# Patient Record
Sex: FEMALE | Race: WHITE | NOT HISPANIC OR LATINO | URBAN - METROPOLITAN AREA
[De-identification: names, ages, dates, MRNs, and addresses within clinical notes are randomized per-mention and may not be internally consistent; named-entity substitution may affect disease eponyms.]

---

## 2018-11-07 ENCOUNTER — INPATIENT (INPATIENT)
Facility: HOSPITAL | Age: 73
LOS: 8 days | Discharge: NURSING, REHAB OR LONG-TERM CARE | End: 2018-11-16
Attending: INTERNAL MEDICINE | Admitting: INTERNAL MEDICINE

## 2018-11-07 VITALS
TEMPERATURE: 96 F | OXYGEN SATURATION: 96 % | RESPIRATION RATE: 17 BRPM | HEART RATE: 89 BPM | SYSTOLIC BLOOD PRESSURE: 149 MMHG | DIASTOLIC BLOOD PRESSURE: 74 MMHG

## 2018-11-07 LAB
ALBUMIN SERPL ELPH-MCNC: 3.4 G/DL — LOW (ref 3.5–5.2)
ALP SERPL-CCNC: 68 U/L — SIGNIFICANT CHANGE UP (ref 30–115)
ALT FLD-CCNC: 15 U/L — SIGNIFICANT CHANGE UP (ref 0–41)
ANION GAP SERPL CALC-SCNC: 17 MMOL/L — HIGH (ref 7–14)
APTT BLD: 21.4 SEC — CRITICAL LOW (ref 27–39.2)
AST SERPL-CCNC: 36 U/L — SIGNIFICANT CHANGE UP (ref 0–41)
BASOPHILS # BLD AUTO: 0 K/UL — SIGNIFICANT CHANGE UP (ref 0–0.2)
BASOPHILS NFR BLD AUTO: 0 % — SIGNIFICANT CHANGE UP (ref 0–1)
BILIRUB SERPL-MCNC: 0.3 MG/DL — SIGNIFICANT CHANGE UP (ref 0.2–1.2)
BUN SERPL-MCNC: 72 MG/DL — CRITICAL HIGH (ref 10–20)
CALCIUM SERPL-MCNC: 8.4 MG/DL — LOW (ref 8.5–10.1)
CHLORIDE SERPL-SCNC: 93 MMOL/L — LOW (ref 98–110)
CK SERPL-CCNC: 467 U/L — HIGH (ref 0–225)
CO2 SERPL-SCNC: 20 MMOL/L — SIGNIFICANT CHANGE UP (ref 17–32)
CREAT SERPL-MCNC: 3.4 MG/DL — HIGH (ref 0.7–1.5)
EOSINOPHIL # BLD AUTO: 0.19 K/UL — SIGNIFICANT CHANGE UP (ref 0–0.7)
EOSINOPHIL NFR BLD AUTO: 1.7 % — SIGNIFICANT CHANGE UP (ref 0–8)
GAS PNL BLDV: SIGNIFICANT CHANGE UP
GLUCOSE SERPL-MCNC: 121 MG/DL — HIGH (ref 70–99)
HCT VFR BLD CALC: 30 % — LOW (ref 37–47)
HGB BLD-MCNC: 9.8 G/DL — LOW (ref 12–16)
INR BLD: 0.94 RATIO — SIGNIFICANT CHANGE UP (ref 0.65–1.3)
LYMPHOCYTES # BLD AUTO: 1.08 K/UL — LOW (ref 1.2–3.4)
LYMPHOCYTES # BLD AUTO: 9.6 % — LOW (ref 20.5–51.1)
MANUAL SMEAR VERIFICATION: SIGNIFICANT CHANGE UP
MCHC RBC-ENTMCNC: 30.5 PG — SIGNIFICANT CHANGE UP (ref 27–31)
MCHC RBC-ENTMCNC: 32.7 G/DL — SIGNIFICANT CHANGE UP (ref 32–37)
MCV RBC AUTO: 93.5 FL — SIGNIFICANT CHANGE UP (ref 81–99)
MONOCYTES # BLD AUTO: 0.29 K/UL — SIGNIFICANT CHANGE UP (ref 0.1–0.6)
MONOCYTES NFR BLD AUTO: 2.6 % — SIGNIFICANT CHANGE UP (ref 1.7–9.3)
NEUTROPHILS # BLD AUTO: 9.71 K/UL — HIGH (ref 1.4–6.5)
NEUTROPHILS NFR BLD AUTO: 86.1 % — HIGH (ref 42.2–75.2)
NRBC # BLD: 0 /100 WBCS — SIGNIFICANT CHANGE UP (ref 0–0)
PLAT MORPH BLD: NORMAL — SIGNIFICANT CHANGE UP
PLATELET # BLD AUTO: 72 K/UL — LOW (ref 130–400)
POTASSIUM SERPL-MCNC: 5.9 MMOL/L — HIGH (ref 3.5–5)
POTASSIUM SERPL-SCNC: 5.9 MMOL/L — HIGH (ref 3.5–5)
PROT SERPL-MCNC: 7.2 G/DL — SIGNIFICANT CHANGE UP (ref 6–8)
PROTHROM AB SERPL-ACNC: 10.8 SEC — SIGNIFICANT CHANGE UP (ref 9.95–12.87)
RBC # BLD: 3.21 M/UL — LOW (ref 4.2–5.4)
RBC # FLD: 13.9 % — SIGNIFICANT CHANGE UP (ref 11.5–14.5)
RBC BLD AUTO: NORMAL — SIGNIFICANT CHANGE UP
SODIUM SERPL-SCNC: 130 MMOL/L — LOW (ref 135–146)
WBC # BLD: 11.28 K/UL — HIGH (ref 4.8–10.8)
WBC # FLD AUTO: 11.28 K/UL — HIGH (ref 4.8–10.8)

## 2018-11-07 RX ORDER — SODIUM CHLORIDE 9 MG/ML
1000 INJECTION INTRAMUSCULAR; INTRAVENOUS; SUBCUTANEOUS ONCE
Qty: 0 | Refills: 0 | Status: COMPLETED | OUTPATIENT
Start: 2018-11-07 | End: 2018-11-07

## 2018-11-07 RX ADMIN — SODIUM CHLORIDE 2000 MILLILITER(S): 9 INJECTION INTRAMUSCULAR; INTRAVENOUS; SUBCUTANEOUS at 19:30

## 2018-11-07 NOTE — ED ADULT TRIAGE NOTE - CHIEF COMPLAINT QUOTE
"I fell and hurt my left ankle last Monday." Denies LOC. "I fell and hurt my left ankle last Monday." Denies LOC. Pt given Fentanyl 100 mg IVP by Paramedics pta. "I fell and hurt my left ankle last Monday." Denies LOC. Pt given Fentanyl 100 mg IVP by Paramedics pta.  Note: Bariatric patient.

## 2018-11-07 NOTE — ED PROVIDER NOTE - CARE PLAN
Principal Discharge DX:	Ambulatory dysfunction  Secondary Diagnosis:	Hyponatremia  Secondary Diagnosis:	Acute kidney failure

## 2018-11-07 NOTE — ED PROVIDER NOTE - OBJECTIVE STATEMENT
74 y/o F HTN, HLD, DM, obesity, CKD p/f fall at home c/o L ankle pain. Per  pt slipped while getting out of bed and transitioning to her walker on Monday morning. Then she slipped again while he was at work and was on the floor all day long until he found her and called [employer] to help get her back in bed, pt is no unable to get out of bed and ambulate.   also complains that pt is very forgetful for the past few weeks.

## 2018-11-07 NOTE — ED PROVIDER NOTE - ATTENDING CONTRIBUTION TO CARE
74 y/o F HTN, HLD, DM, obesity, CKD p/f fall at home c/o L ankle pain, patient at baseline needs help with every aspect of daily living. patient has a history ofsevere obesity, almost completely bed bound, patient attempted to ambulate with a walker and hurt her left ankle. No cp/sob, no n/v/d, no loc, no fever    CONSTITUTIONAL: Well-developed; well-nourished; in no acute distress. Sitting up and providing appropriate history and physical examination  SKIN: skin exam is warm and dry, no acute rash.  HEAD: Normocephalic; atraumatic.  EYES: PERRL, 3 mm bilateral, no nystagmus, EOM intact; conjunctiva and sclera clear.  ENT: No nasal discharge; airway clear.  NECK: Supple; non tender. + full passive ROM in all directions. No JVD  CARD: S1, S2 normal; no murmurs, gallops, or rubs. Regular rate and rhythm. + Symmetric Strong Pulses  RESP: No wheezes, rales or rhonchi. Good air movement bilaterally  ABD: soft; non-distended; non-tender. No Rebound, No Guarding, No signs of peritonitis, No CVA tenderness. No pulsatile abdominal mass. + Strong and Symmetric Pulses  EXT: + left lateral malleolar tenderness, + Unable to ambulate, limited ROM of the left ankle. No clubbing, cyanosis. Dp and Pt Pulses intact. Cap refill less than 3 seconds  NEURO: CN 2-12 intact,  no sensory or motor deficits, Alert, oriented, grossly unremarkable. No Focal deficits. GCS 15. NIH 0      Patient unable to ambulate, will admit

## 2018-11-07 NOTE — ED PROVIDER NOTE - NS ED ROS FT
Constitutional: NAD  Eyes: No visual changes, eye pain or discharge.  ENMT: No hearing changes, pain, discharge or infections. No neck pain or stiffness.  Cardiac: No chest pain, SOB or edema. No chest pain with exertion.  Respiratory: No cough or respiratory distress.   GI: No nausea, vomiting, diarrhea or abdominal pain.  : No dysuria, frequency or burning.  MS: No myalgia, muscle weakness, +ankle pain. No back pain.  Neuro: No headache or weakness. No LOC.  Skin: No skin rash.  Heme: No bruising

## 2018-11-07 NOTE — ED ADULT NURSE NOTE - OBJECTIVE STATEMENT
pt here c/o r ankle pain s/p fall at home . denies hitting head or LOC. Left ankle pain. pt morbidly obese. placed on bariatric bed on arrival and changed.

## 2018-11-07 NOTE — ED PROVIDER NOTE - PHYSICAL EXAMINATION
General: AOx4, morbidly obese. Non toxic appearing, NAD, speaking in full sentences.   Skin: Warm and dry, no acute rash.   Head:  Normocephalic, atraumatic.   EENT: PERRLA/EOMI, conjunctiva and sclera clear. MM moist, no nasal discharge.    Neck: Supple nt, no meningeal signs.   Heart RRR s1s2 nl, no rub/murmur.   Lungs- No retractions, BS equal, CTAB.   Abdomen: Soft ntnd no r/g.   Extremities- moves all, +equal distal pulses, brisk cap refill. No LE edema, calves nttp b/l. L ankle mildly ttp.   Neuro: Sensation wnl, CN 2-12 grossly intact. +5/5 muscle strength throughout.  Psych: Cooperative, appropriate

## 2018-11-07 NOTE — ED ADULT NURSE NOTE - CHIEF COMPLAINT QUOTE
"I fell and hurt my left ankle last Monday." Denies LOC. Pt given Fentanyl 100 mg IVP by Paramedics pta.  Note: Bariatric patient.

## 2018-11-07 NOTE — ED ADULT NURSE NOTE - NSIMPLEMENTINTERV_GEN_ALL_ED
Implemented All Fall with Harm Risk Interventions:  [place] to call system. Call bell, personal items and telephone within reach. Instruct patient to call for assistance. Room bathroom lighting operational. Non-slip footwear when patient is off stretcher. Physically safe environment: no spills, clutter or unnecessary equipment. Stretcher in lowest position, wheels locked, appropriate side rails in place. Provide visual cue, wrist band, yellow gown, etc. Monitor gait and stability. Monitor for mental status changes and reorient to person, place, and time. Review medications for side effects contributing to fall risk. Reinforce activity limits and safety measures with patient and family. Provide visual clues: red socks.

## 2018-11-08 LAB
ANION GAP SERPL CALC-SCNC: 18 MMOL/L — HIGH (ref 7–14)
BASOPHILS # BLD AUTO: 0.03 K/UL — SIGNIFICANT CHANGE UP (ref 0–0.2)
BASOPHILS NFR BLD AUTO: 0.3 % — SIGNIFICANT CHANGE UP (ref 0–1)
BUN SERPL-MCNC: 67 MG/DL — CRITICAL HIGH (ref 10–20)
CALCIUM SERPL-MCNC: 8.1 MG/DL — LOW (ref 8.5–10.1)
CHLORIDE SERPL-SCNC: 98 MMOL/L — SIGNIFICANT CHANGE UP (ref 98–110)
CO2 SERPL-SCNC: 19 MMOL/L — SIGNIFICANT CHANGE UP (ref 17–32)
CREAT SERPL-MCNC: 3.2 MG/DL — HIGH (ref 0.7–1.5)
EOSINOPHIL # BLD AUTO: 0.31 K/UL — SIGNIFICANT CHANGE UP (ref 0–0.7)
EOSINOPHIL NFR BLD AUTO: 3.1 % — SIGNIFICANT CHANGE UP (ref 0–8)
ESTIMATED AVERAGE GLUCOSE: 140 MG/DL — HIGH (ref 68–114)
GLUCOSE BLDC GLUCOMTR-MCNC: 164 MG/DL — HIGH (ref 70–99)
GLUCOSE SERPL-MCNC: 125 MG/DL — HIGH (ref 70–99)
HBA1C BLD-MCNC: 6.5 % — HIGH (ref 4–5.6)
HCT VFR BLD CALC: 30.8 % — LOW (ref 37–47)
HGB BLD-MCNC: 9.5 G/DL — LOW (ref 12–16)
IMM GRANULOCYTES NFR BLD AUTO: 0.6 % — HIGH (ref 0.1–0.3)
LYMPHOCYTES # BLD AUTO: 0.85 K/UL — LOW (ref 1.2–3.4)
LYMPHOCYTES # BLD AUTO: 8.6 % — LOW (ref 20.5–51.1)
MAGNESIUM SERPL-MCNC: 3.2 MG/DL — CRITICAL HIGH (ref 1.8–2.4)
MCHC RBC-ENTMCNC: 29.7 PG — SIGNIFICANT CHANGE UP (ref 27–31)
MCHC RBC-ENTMCNC: 30.8 G/DL — LOW (ref 32–37)
MCV RBC AUTO: 96.3 FL — SIGNIFICANT CHANGE UP (ref 81–99)
MONOCYTES # BLD AUTO: 0.67 K/UL — HIGH (ref 0.1–0.6)
MONOCYTES NFR BLD AUTO: 6.8 % — SIGNIFICANT CHANGE UP (ref 1.7–9.3)
NEUTROPHILS # BLD AUTO: 7.99 K/UL — HIGH (ref 1.4–6.5)
NEUTROPHILS NFR BLD AUTO: 80.6 % — HIGH (ref 42.2–75.2)
NRBC # BLD: 0 /100 WBCS — SIGNIFICANT CHANGE UP (ref 0–0)
PHOSPHATE SERPL-MCNC: 5.3 MG/DL — HIGH (ref 2.1–4.9)
PLATELET # BLD AUTO: 85 K/UL — LOW (ref 130–400)
POTASSIUM SERPL-MCNC: 6 MMOL/L — CRITICAL HIGH (ref 3.5–5)
POTASSIUM SERPL-SCNC: 6 MMOL/L — CRITICAL HIGH (ref 3.5–5)
RBC # BLD: 3.2 M/UL — LOW (ref 4.2–5.4)
RBC # FLD: 13.9 % — SIGNIFICANT CHANGE UP (ref 11.5–14.5)
SODIUM SERPL-SCNC: 135 MMOL/L — SIGNIFICANT CHANGE UP (ref 135–146)
WBC # BLD: 9.91 K/UL — SIGNIFICANT CHANGE UP (ref 4.8–10.8)
WBC # FLD AUTO: 9.91 K/UL — SIGNIFICANT CHANGE UP (ref 4.8–10.8)

## 2018-11-08 RX ORDER — TRAMADOL HYDROCHLORIDE 50 MG/1
50 TABLET ORAL EVERY 4 HOURS
Qty: 0 | Refills: 0 | Status: DISCONTINUED | OUTPATIENT
Start: 2018-11-08 | End: 2018-11-14

## 2018-11-08 RX ORDER — GLUCAGON INJECTION, SOLUTION 0.5 MG/.1ML
1 INJECTION, SOLUTION SUBCUTANEOUS ONCE
Qty: 0 | Refills: 0 | Status: DISCONTINUED | OUTPATIENT
Start: 2018-11-08 | End: 2018-11-10

## 2018-11-08 RX ORDER — DEXTROSE 50 % IN WATER 50 %
25 SYRINGE (ML) INTRAVENOUS ONCE
Qty: 0 | Refills: 0 | Status: DISCONTINUED | OUTPATIENT
Start: 2018-11-08 | End: 2018-11-10

## 2018-11-08 RX ORDER — DEXTROSE 50 % IN WATER 50 %
12.5 SYRINGE (ML) INTRAVENOUS ONCE
Qty: 0 | Refills: 0 | Status: DISCONTINUED | OUTPATIENT
Start: 2018-11-08 | End: 2018-11-10

## 2018-11-08 RX ORDER — SIMVASTATIN 20 MG/1
20 TABLET, FILM COATED ORAL AT BEDTIME
Qty: 0 | Refills: 0 | Status: DISCONTINUED | OUTPATIENT
Start: 2018-11-08 | End: 2018-11-16

## 2018-11-08 RX ORDER — DOCUSATE SODIUM 100 MG
100 CAPSULE ORAL DAILY
Qty: 0 | Refills: 0 | Status: DISCONTINUED | OUTPATIENT
Start: 2018-11-08 | End: 2018-11-16

## 2018-11-08 RX ORDER — INSULIN LISPRO 100/ML
5 VIAL (ML) SUBCUTANEOUS
Qty: 0 | Refills: 0 | Status: DISCONTINUED | OUTPATIENT
Start: 2018-11-08 | End: 2018-11-08

## 2018-11-08 RX ORDER — PANTOPRAZOLE SODIUM 20 MG/1
40 TABLET, DELAYED RELEASE ORAL
Qty: 0 | Refills: 0 | Status: DISCONTINUED | OUTPATIENT
Start: 2018-11-08 | End: 2018-11-16

## 2018-11-08 RX ORDER — LOSARTAN POTASSIUM 100 MG/1
25 TABLET, FILM COATED ORAL DAILY
Qty: 0 | Refills: 0 | Status: DISCONTINUED | OUTPATIENT
Start: 2018-11-08 | End: 2018-11-08

## 2018-11-08 RX ORDER — DEXTROSE 50 % IN WATER 50 %
15 SYRINGE (ML) INTRAVENOUS ONCE
Qty: 0 | Refills: 0 | Status: DISCONTINUED | OUTPATIENT
Start: 2018-11-08 | End: 2018-11-10

## 2018-11-08 RX ORDER — HEPARIN SODIUM 5000 [USP'U]/ML
5000 INJECTION INTRAVENOUS; SUBCUTANEOUS EVERY 8 HOURS
Qty: 0 | Refills: 0 | Status: DISCONTINUED | OUTPATIENT
Start: 2018-11-08 | End: 2018-11-16

## 2018-11-08 RX ORDER — METOPROLOL TARTRATE 50 MG
50 TABLET ORAL DAILY
Qty: 0 | Refills: 0 | Status: DISCONTINUED | OUTPATIENT
Start: 2018-11-08 | End: 2018-11-16

## 2018-11-08 RX ORDER — FOLIC ACID 0.8 MG
1 TABLET ORAL DAILY
Qty: 0 | Refills: 0 | Status: DISCONTINUED | OUTPATIENT
Start: 2018-11-08 | End: 2018-11-16

## 2018-11-08 RX ORDER — INSULIN GLARGINE 100 [IU]/ML
15 INJECTION, SOLUTION SUBCUTANEOUS AT BEDTIME
Qty: 0 | Refills: 0 | Status: DISCONTINUED | OUTPATIENT
Start: 2018-11-08 | End: 2018-11-08

## 2018-11-08 RX ORDER — SENNA PLUS 8.6 MG/1
1 TABLET ORAL DAILY
Qty: 0 | Refills: 0 | Status: DISCONTINUED | OUTPATIENT
Start: 2018-11-08 | End: 2018-11-16

## 2018-11-08 RX ORDER — SODIUM CHLORIDE 9 MG/ML
1000 INJECTION INTRAMUSCULAR; INTRAVENOUS; SUBCUTANEOUS
Qty: 0 | Refills: 0 | Status: DISCONTINUED | OUTPATIENT
Start: 2018-11-08 | End: 2018-11-11

## 2018-11-08 RX ORDER — SODIUM CHLORIDE 9 MG/ML
1000 INJECTION, SOLUTION INTRAVENOUS
Qty: 0 | Refills: 0 | Status: DISCONTINUED | OUTPATIENT
Start: 2018-11-08 | End: 2018-11-10

## 2018-11-08 RX ORDER — INSULIN LISPRO 100/ML
VIAL (ML) SUBCUTANEOUS
Qty: 0 | Refills: 0 | Status: DISCONTINUED | OUTPATIENT
Start: 2018-11-08 | End: 2018-11-08

## 2018-11-08 RX ADMIN — HEPARIN SODIUM 5000 UNIT(S): 5000 INJECTION INTRAVENOUS; SUBCUTANEOUS at 21:40

## 2018-11-08 RX ADMIN — SODIUM CHLORIDE 75 MILLILITER(S): 9 INJECTION INTRAMUSCULAR; INTRAVENOUS; SUBCUTANEOUS at 08:56

## 2018-11-08 RX ADMIN — SODIUM CHLORIDE 75 MILLILITER(S): 9 INJECTION INTRAMUSCULAR; INTRAVENOUS; SUBCUTANEOUS at 03:14

## 2018-11-08 RX ADMIN — PANTOPRAZOLE SODIUM 40 MILLIGRAM(S): 20 TABLET, DELAYED RELEASE ORAL at 08:56

## 2018-11-08 RX ADMIN — Medication 1 MILLIGRAM(S): at 11:45

## 2018-11-08 RX ADMIN — Medication 50 MILLIGRAM(S): at 05:28

## 2018-11-08 RX ADMIN — HEPARIN SODIUM 5000 UNIT(S): 5000 INJECTION INTRAVENOUS; SUBCUTANEOUS at 05:28

## 2018-11-08 RX ADMIN — HEPARIN SODIUM 5000 UNIT(S): 5000 INJECTION INTRAVENOUS; SUBCUTANEOUS at 14:19

## 2018-11-08 RX ADMIN — LOSARTAN POTASSIUM 25 MILLIGRAM(S): 100 TABLET, FILM COATED ORAL at 05:28

## 2018-11-08 RX ADMIN — SIMVASTATIN 20 MILLIGRAM(S): 20 TABLET, FILM COATED ORAL at 21:40

## 2018-11-08 NOTE — H&P ADULT - HISTORY OF PRESENT ILLNESS
74 y/o morbidly obese female patient with PMHx of HTN, DM, DLD and CKD p/w mechanical fall PTP. history was obtained from the ED chart as the patient is confused and unreliable and the  is not answering. Patient fell and twisted her ankle while getting out of bed to use her walker to the bathroom days ago.  Then she slipped again while he was at work and was on the floor all day long until her  found her and called [facility] to help get her back in bed. pt is no unable to get out of bed and ambulate. patient walks with a walker at baseline.   ROS wasn't obtained as patient is confused and unreliable.  in the ed Vital Signs Last 24 Hrs  T(C): 36.8 (08 Nov 2018 00:01), Max: 36.8 (08 Nov 2018 00:01)  T(F): 98.3 (08 Nov 2018 00:01), Max: 98.3 (08 Nov 2018 00:01)  HR: 87 (08 Nov 2018 00:01) (87 - 88)  BP: 137/64 (08 Nov 2018 00:01) (137/64 - 149/74)  BP(mean): --  RR: 18 (08 Nov 2018 00:01) (17 - 18)  SpO2: 96% (08 Nov 2018 00:01) (95% - 96%)    No ankle fracture per ED but admitted for PT and rehab

## 2018-11-08 NOTE — H&P ADULT - ASSESSMENT
74 y/o morbidly obese female patient with PMHx of HTN, DM, DLD and CKD p/w mechanical fall.      #Mechanical fall in the setting of morbid obesity and inability to ambulate   No evidence of fracture per ED  unsure of patient baseline status    admit to medicine  pain cotrol with tramadol  PT and rehab  confirm with the family in the am, patient baseline status     #LALO vs CKD  unknown baseline  urine electrolytes   monitor BMP  IV fluids    #Hyponatremia   proabably 2/2 to dehdyration  monitor BMP  IV fluids    #DM  insulin protocol  monitor FS    #HTN and DLD  c/w losartan and simvstatin    #Others  Diet, Consistent carb diet  DVT and GI PPX  from home  Full code 72 y/o morbidly obese female patient with PMHx of HTN, DM, DLD and CKD p/w mechanical fall.      #Mechanical fall in the setting of morbid obesity and inability to ambulate   No evidence of fracture per ED  unsure of patient baseline status    admit to medicine  pain cotrol with tramadol  PT and rehab  confirm with the family in the am, patient baseline status     #LALO vs CKD  unknown baseline  urine electrolytes   monitor BMP  IV fluids    #Hyponatremia   proabably 2/2 to dehdyration  monitor BMP  IV fluids    #DM  insulin protocol if FS more than 180  monitor FS    #HTN and DLD  c/w losartan and simvstatin    #Others  Diet, Consistent carb diet  DVT and GI PPX  from home  Full code

## 2018-11-08 NOTE — H&P ADULT - ATTENDING COMMENTS
74 y/o morbidly obese female patient with PMHx of HTN, DM, DLD and CKD p/w mechanical fall PTP. history was obtained from the ED chart as the patient is confused and unreliable and the  is not answering. Patient fell and twisted her ankle while getting out of bed to use her walker to the bathroom days ago.  Then she slipped again while he was at work and was on the floor all day long until her  found her and called [facility] to help get her back in bed. pt is no unable to get out of bed and ambulate. patient walks with a walker at baseline.   ROS wasn't obtained as patient is confused and unreliable.    REVIEW OF SYSTEMS: see cc/HPI  CONSTITUTIONAL: (+) Morbid obesity, No weakness, fevers or chills (+) pain -L ankle 2/2 fall and injury  EYES/ENT: No visual changes;  No vertigo or throat pain   NECK: No pain or stiffness  RESPIRATORY: No cough, wheezing, hemoptysis; No shortness of breath  CARDIOVASCULAR: No chest pain or palpitations  GASTROINTESTINAL: No abdominal or epigastric pain. No nausea, vomiting, or hematemesis; No diarrhea or constipation. No melena or hematochezia.  GENITOURINARY: No dysuria, frequency or hematuria  NEUROLOGICAL: No numbness or weakness  SKIN: No itching, rashes  Musculoskeletal - diff ambulating    Physical Exam:  General: WN/WD NAD, Morbid obesity   Neurology: A&Ox3, nonfocal, follows commands  Eyes: PERRLA/ EOMI  ENT/Neck: Neck supple, trachea midline, No JVD  Respiratory: CTA B/L, No wheezing, rales, rhonchi  CV: Normal rate regular rhythm, S1S2, no murmurs, rubs or gallops  Abdominal: Soft, NT, ND +BS,   Extremities: No edema, + peripheral pulses  Skin: No Rashes, Hematoma, Ecchymosis  Incisions: n/a  Tubes: n/a    A/P  Fall / L ankle pain-sprain   -ankle support and PRN pain Rx  -PT / Rehab eval   -fall precautions   -DVT prophylaxis   -f/U official X-ray report    LALO on ?? CKD  -IV hydration   -Is and Os, Urine lytes/cr, Renal U/S   -serial BMP, iPhos, Ca++  -renal eval if not improving   -stop ARB    Hyponatremia   -IV fluids  -serial BMP    DM/Morbid Obesity   -Dietary eval   -F/S monitoring and insulin coverage PRN    DVT prophylaxis

## 2018-11-08 NOTE — H&P ADULT - NSHPLABSRESULTS_GEN_ALL_CORE
9.8    11.28 )-----------( 74       ( 07 Nov 2018 19:30 )             30.0       11-07    130<L>  |  93<L>  |  72<HH>  ----------------------------<  121<H>  5.9<H>   |  20  |  3.4<H>    Ca    8.4<L>      07 Nov 2018 19:30    TPro  7.2  /  Alb  3.4<L>  /  TBili  0.3  /  DBili  x   /  AST  36  /  ALT  15  /  AlkPhos  68  11-07           PT/INR - ( 07 Nov 2018 19:30 )   PT: 10.80 sec;   INR: 0.94 ratio         PTT - ( 07 Nov 2018 19:30 )  PTT:21.4 sec        CARDIAC MARKERS ( 07 Nov 2018 19:30 )  x     / x     / 467 U/L / x     / x

## 2018-11-08 NOTE — ED ADULT NURSE REASSESSMENT NOTE - NS ED NURSE REASSESS COMMENT FT1
patient continues on airborne isolation. Patient vitals recorded. patient stable at this time. NAD noted. all care rendered for patient. monitoring continues.

## 2018-11-08 NOTE — H&P ADULT - NSHPPHYSICALEXAM_GEN_ALL_CORE
T(C): 36.8 (11-08-18 @ 00:01), Max: 36.8 (11-08-18 @ 00:01)  HR: 87 (11-08-18 @ 00:01) (87 - 88)  BP: 137/64 (11-08-18 @ 00:01) (137/64 - 149/74)  RR: 18 (11-08-18 @ 00:01) (17 - 18)  SpO2: 96% (11-08-18 @ 00:01) (95% - 96%)    PHYSICAL EXAM:  GENERAL: morbid obesity  HEAD:  Atraumatic, Normocephalic  EYES: EOMI, PERRLA, conjunctiva and sclera clear  ENT:No nasal obstruction or discharge. No tonsillar exudate, swelling or erythema.  NECK: Supple, No JVD  CHEST/LUNG: Clear to auscultation bilaterally; No wheeze  HEART: Regular rate and rhythm; No murmurs, rubs, or gallops  ABDOMEN: Soft, Nontender, Nondistended; Bowel sounds present  EXTREMITIES:  2+ Peripheral Pulses, No clubbing, cyanosis, or edema  PSYCH: AAOx2-3 and confused. doesn't appropriate answer questions  NEUROLOGY: non-focal

## 2018-11-09 LAB
ANION GAP SERPL CALC-SCNC: 16 MMOL/L — HIGH (ref 7–14)
ANION GAP SERPL CALC-SCNC: 18 MMOL/L — HIGH (ref 7–14)
BASOPHILS # BLD AUTO: 0.02 K/UL — SIGNIFICANT CHANGE UP (ref 0–0.2)
BASOPHILS # BLD AUTO: 0.02 K/UL — SIGNIFICANT CHANGE UP (ref 0–0.2)
BASOPHILS NFR BLD AUTO: 0.2 % — SIGNIFICANT CHANGE UP (ref 0–1)
BASOPHILS NFR BLD AUTO: 0.2 % — SIGNIFICANT CHANGE UP (ref 0–1)
BUN SERPL-MCNC: 61 MG/DL — CRITICAL HIGH (ref 10–20)
BUN SERPL-MCNC: 64 MG/DL — CRITICAL HIGH (ref 10–20)
CALCIUM SERPL-MCNC: 7.2 MG/DL — LOW (ref 8.5–10.1)
CALCIUM SERPL-MCNC: 7.9 MG/DL — LOW (ref 8.5–10.1)
CHLORIDE SERPL-SCNC: 97 MMOL/L — LOW (ref 98–110)
CHLORIDE SERPL-SCNC: 98 MMOL/L — SIGNIFICANT CHANGE UP (ref 98–110)
CHLORIDE UR-SCNC: 44 — SIGNIFICANT CHANGE UP
CO2 SERPL-SCNC: 18 MMOL/L — SIGNIFICANT CHANGE UP (ref 17–32)
CO2 SERPL-SCNC: 18 MMOL/L — SIGNIFICANT CHANGE UP (ref 17–32)
CREAT ?TM UR-MCNC: 79 MG/DL — SIGNIFICANT CHANGE UP
CREAT SERPL-MCNC: 2.7 MG/DL — HIGH (ref 0.7–1.5)
CREAT SERPL-MCNC: 2.8 MG/DL — HIGH (ref 0.7–1.5)
EOSINOPHIL # BLD AUTO: 0.19 K/UL — SIGNIFICANT CHANGE UP (ref 0–0.7)
EOSINOPHIL # BLD AUTO: 0.19 K/UL — SIGNIFICANT CHANGE UP (ref 0–0.7)
EOSINOPHIL NFR BLD AUTO: 2 % — SIGNIFICANT CHANGE UP (ref 0–8)
EOSINOPHIL NFR BLD AUTO: 2.2 % — SIGNIFICANT CHANGE UP (ref 0–8)
GLUCOSE BLDC GLUCOMTR-MCNC: 160 MG/DL — HIGH (ref 70–99)
GLUCOSE BLDC GLUCOMTR-MCNC: 167 MG/DL — HIGH (ref 70–99)
GLUCOSE BLDC GLUCOMTR-MCNC: 193 MG/DL — HIGH (ref 70–99)
GLUCOSE BLDC GLUCOMTR-MCNC: 218 MG/DL — HIGH (ref 70–99)
GLUCOSE SERPL-MCNC: 149 MG/DL — HIGH (ref 70–99)
GLUCOSE SERPL-MCNC: 169 MG/DL — HIGH (ref 70–99)
HCT VFR BLD CALC: 28.2 % — LOW (ref 37–47)
HCT VFR BLD CALC: 28.5 % — LOW (ref 37–47)
HGB BLD-MCNC: 9.1 G/DL — LOW (ref 12–16)
HGB BLD-MCNC: 9.2 G/DL — LOW (ref 12–16)
IMM GRANULOCYTES NFR BLD AUTO: 0.8 % — HIGH (ref 0.1–0.3)
IMM GRANULOCYTES NFR BLD AUTO: 0.8 % — HIGH (ref 0.1–0.3)
LYMPHOCYTES # BLD AUTO: 0.63 K/UL — LOW (ref 1.2–3.4)
LYMPHOCYTES # BLD AUTO: 0.71 K/UL — LOW (ref 1.2–3.4)
LYMPHOCYTES # BLD AUTO: 7.3 % — LOW (ref 20.5–51.1)
LYMPHOCYTES # BLD AUTO: 7.4 % — LOW (ref 20.5–51.1)
MAGNESIUM SERPL-MCNC: 2.6 MG/DL — HIGH (ref 1.8–2.4)
MCHC RBC-ENTMCNC: 30.4 PG — SIGNIFICANT CHANGE UP (ref 27–31)
MCHC RBC-ENTMCNC: 30.7 PG — SIGNIFICANT CHANGE UP (ref 27–31)
MCHC RBC-ENTMCNC: 32.3 G/DL — SIGNIFICANT CHANGE UP (ref 32–37)
MCHC RBC-ENTMCNC: 32.3 G/DL — SIGNIFICANT CHANGE UP (ref 32–37)
MCV RBC AUTO: 94.3 FL — SIGNIFICANT CHANGE UP (ref 81–99)
MCV RBC AUTO: 95 FL — SIGNIFICANT CHANGE UP (ref 81–99)
MONOCYTES # BLD AUTO: 0.57 K/UL — SIGNIFICANT CHANGE UP (ref 0.1–0.6)
MONOCYTES # BLD AUTO: 0.73 K/UL — HIGH (ref 0.1–0.6)
MONOCYTES NFR BLD AUTO: 6.7 % — SIGNIFICANT CHANGE UP (ref 1.7–9.3)
MONOCYTES NFR BLD AUTO: 7.5 % — SIGNIFICANT CHANGE UP (ref 1.7–9.3)
NEUTROPHILS # BLD AUTO: 7.09 K/UL — HIGH (ref 1.4–6.5)
NEUTROPHILS # BLD AUTO: 7.99 K/UL — HIGH (ref 1.4–6.5)
NEUTROPHILS NFR BLD AUTO: 82.2 % — HIGH (ref 42.2–75.2)
NEUTROPHILS NFR BLD AUTO: 82.7 % — HIGH (ref 42.2–75.2)
NRBC # BLD: 0 /100 WBCS — SIGNIFICANT CHANGE UP (ref 0–0)
PLATELET # BLD AUTO: 89 K/UL — LOW (ref 130–400)
PLATELET # BLD AUTO: 90 K/UL — LOW (ref 130–400)
POTASSIUM SERPL-MCNC: 4.6 MMOL/L — SIGNIFICANT CHANGE UP (ref 3.5–5)
POTASSIUM SERPL-MCNC: 4.9 MMOL/L — SIGNIFICANT CHANGE UP (ref 3.5–5)
POTASSIUM SERPL-SCNC: 4.6 MMOL/L — SIGNIFICANT CHANGE UP (ref 3.5–5)
POTASSIUM SERPL-SCNC: 4.9 MMOL/L — SIGNIFICANT CHANGE UP (ref 3.5–5)
POTASSIUM UR-SCNC: 33 MMOL/L — SIGNIFICANT CHANGE UP
PROT ?TM UR-MCNC: 218 MG/DLG/24H — SIGNIFICANT CHANGE UP
PROT/CREAT UR-RTO: 2.8 RATIO — HIGH (ref 0–0.2)
RBC # BLD: 2.99 M/UL — LOW (ref 4.2–5.4)
RBC # BLD: 3 M/UL — LOW (ref 4.2–5.4)
RBC # FLD: 13.4 % — SIGNIFICANT CHANGE UP (ref 11.5–14.5)
RBC # FLD: 13.4 % — SIGNIFICANT CHANGE UP (ref 11.5–14.5)
SODIUM SERPL-SCNC: 132 MMOL/L — LOW (ref 135–146)
SODIUM SERPL-SCNC: 133 MMOL/L — LOW (ref 135–146)
SODIUM UR-SCNC: 64 MMOL/L — SIGNIFICANT CHANGE UP
WBC # BLD: 8.57 K/UL — SIGNIFICANT CHANGE UP (ref 4.8–10.8)
WBC # BLD: 9.72 K/UL — SIGNIFICANT CHANGE UP (ref 4.8–10.8)
WBC # FLD AUTO: 8.57 K/UL — SIGNIFICANT CHANGE UP (ref 4.8–10.8)
WBC # FLD AUTO: 9.72 K/UL — SIGNIFICANT CHANGE UP (ref 4.8–10.8)

## 2018-11-09 RX ORDER — POLYETHYLENE GLYCOL 3350 17 G/17G
17 POWDER, FOR SOLUTION ORAL
Qty: 0 | Refills: 0 | Status: DISCONTINUED | OUTPATIENT
Start: 2018-11-09 | End: 2018-11-16

## 2018-11-09 RX ORDER — LACTULOSE 10 G/15ML
10 SOLUTION ORAL
Qty: 0 | Refills: 0 | Status: DISCONTINUED | OUTPATIENT
Start: 2018-11-09 | End: 2018-11-16

## 2018-11-09 RX ORDER — CHLORHEXIDINE GLUCONATE 213 G/1000ML
1 SOLUTION TOPICAL ONCE
Qty: 0 | Refills: 0 | Status: COMPLETED | OUTPATIENT
Start: 2018-11-09 | End: 2018-11-09

## 2018-11-09 RX ADMIN — SENNA PLUS 1 TABLET(S): 8.6 TABLET ORAL at 00:51

## 2018-11-09 RX ADMIN — SODIUM CHLORIDE 75 MILLILITER(S): 9 INJECTION INTRAMUSCULAR; INTRAVENOUS; SUBCUTANEOUS at 06:54

## 2018-11-09 RX ADMIN — LACTULOSE 10 GRAM(S): 10 SOLUTION ORAL at 11:27

## 2018-11-09 RX ADMIN — Medication 10 MILLIGRAM(S): at 03:43

## 2018-11-09 RX ADMIN — Medication 1 MILLIGRAM(S): at 11:25

## 2018-11-09 RX ADMIN — Medication 50 MILLIGRAM(S): at 05:10

## 2018-11-09 RX ADMIN — PANTOPRAZOLE SODIUM 40 MILLIGRAM(S): 20 TABLET, DELAYED RELEASE ORAL at 08:11

## 2018-11-09 RX ADMIN — POLYETHYLENE GLYCOL 3350 17 GRAM(S): 17 POWDER, FOR SOLUTION ORAL at 17:08

## 2018-11-09 RX ADMIN — HEPARIN SODIUM 5000 UNIT(S): 5000 INJECTION INTRAVENOUS; SUBCUTANEOUS at 22:30

## 2018-11-09 RX ADMIN — CHLORHEXIDINE GLUCONATE 1 APPLICATION(S): 213 SOLUTION TOPICAL at 13:21

## 2018-11-09 RX ADMIN — HEPARIN SODIUM 5000 UNIT(S): 5000 INJECTION INTRAVENOUS; SUBCUTANEOUS at 05:10

## 2018-11-09 RX ADMIN — HEPARIN SODIUM 5000 UNIT(S): 5000 INJECTION INTRAVENOUS; SUBCUTANEOUS at 13:21

## 2018-11-09 RX ADMIN — SIMVASTATIN 20 MILLIGRAM(S): 20 TABLET, FILM COATED ORAL at 22:30

## 2018-11-09 RX ADMIN — Medication 100 MILLIGRAM(S): at 00:52

## 2018-11-09 RX ADMIN — LACTULOSE 10 GRAM(S): 10 SOLUTION ORAL at 05:49

## 2018-11-09 NOTE — CONSULT NOTE ADULT - ASSESSMENT
LALO  - likely prerenal, improving with IVF  CKD stage 3-4  - baseline Cr - 2's  hyperkalemia  -corrected  hyponatremia  -better  morbid obesity  fall / gait disturbance  HTN  anemia    plan:    NS @ 75 cc/hr another 24 hours only  hold losartan  check UA and UC and S  check renal sono - though obesity may make it a difficult study  f/u bmp  avoid NSAIDs / no iv dye  if bp elevation worsen - can add nifedipine xl 30mg po qd

## 2018-11-09 NOTE — PROGRESS NOTE ADULT - ASSESSMENT
74 y/o morbidly obese female patient with PMHx of HTN, DM, DLD and CKD p/w mechanical fall.      #Mechanical fall in the setting of morbid obesity and inability to ambulate   - 11-9-18: Pt continues to c/o pain to left foot/ankle, however no evidence of fracture per ED, Ankle xray was negative. C/w pain cotrol with tramadol. Physiatry recommends STR in SNF, will follow up with     #LALO vs CKD  - 11-9-18: Received medical records from pt's [facility] [employer] call [facility] --> baseline Creatnine ~2, thus pt appears to have LALO, likely due to dehydration. Continue to monitor electrolytes and creatnine, and continue to give IV fluids    #Hyponatremia   - 11-9-18: Pt continues to have low Na = 132 today, proabably 2/2 to dehdyration, will continue to monitor BMP, and will give IV fluids    #DM  insulin protocol if FS more than 180  monitor FS    #HTN and DLD  c/w losartan and simvstatin    #Others  Diet, Consistent carb diet  DVT and GI PPX  from home  Full code

## 2018-11-09 NOTE — PROGRESS NOTE ADULT - ASSESSMENT
72 y/o morbidly obese female patient with PMHx of HTN, DM, DLD and CKD p/w mechanical fall.      #Mechanical fall in the setting of morbid obesity and inability to ambulate   No evidence of fracture at the xray but area tender and swollen.   Pain control now.   Has renal failure.     pain control with tramadol. need frequent renal function check   PT and rehab  patient baseline status - bed bound     #LALO on  CKD with hyperkalemia - Nephrology evaluation   unknown baseline  urine electrolytes   monitor BMP  IV fluids    #Hyponatremia   proabably 2/2 to dehydration no eating enough   monitor BMP daily   IV fluids    #DM  insulin protocol if FS more than 180  monitor FS    #HTN and DLD  c/w losartan and simvastatin    # Hyperkalemia - Correction     #Others  Diet, Consistent carb diet  DVT and GI PPX  from home  Full code

## 2018-11-09 NOTE — CONSULT NOTE ADULT - SUBJECTIVE AND OBJECTIVE BOX
Patient is a 73y old  Female who presents with a chief complaint of mechanical fall (08 Nov 2018 01:59)    HPI:  74 y/o morbidly obese female patient with PMHx of HTN, DM, DLD and CKD p/w mechanical fall PTP. history was obtained from the ED chart as the patient is confused and unreliable and the  is not answering. Patient fell and twisted her ankle while getting out of bed to use her walker to the bathroom days ago.  Then she slipped again while he was at work and was on the floor all day long until her  found her and called [facility] to help get her back in bed. pt is no unable to get out of bed and ambulate. patient walks with a walker at baseline.   ROS wasn't obtained as patient is confused and unreliable.  in the ed Vital Signs Last 24 Hrs  T(C): 36.8 (08 Nov 2018 00:01), Max: 36.8 (08 Nov 2018 00:01)  T(F): 98.3 (08 Nov 2018 00:01), Max: 98.3 (08 Nov 2018 00:01)  HR: 87 (08 Nov 2018 00:01) (87 - 88)  BP: 137/64 (08 Nov 2018 00:01) (137/64 - 149/74)  BP(mean): --  RR: 18 (08 Nov 2018 00:01) (17 - 18)  SpO2: 96% (08 Nov 2018 00:01) (95% - 96%)    No ankle fracture per ED but admitted for PT and rehab (08 Nov 2018 01:59)      PAST MEDICAL & SURGICAL HISTORY:  Hyperlipidemia  HTN (hypertension)  DM (diabetes mellitus)      Hospital Course: on tramadol for pain- c/o constipation- paz on ckd on iv fluids    TODAY'S SUBJECTIVE & REVIEW OF SYMPTOMS:     Constitutional WNL   Cardio WNL   Resp WNL   GI WNL  Heme WNL  Endo WNL  Skin WNL  MSK L ankle pain  Neuro WNL  Cognitive WNL  Psych WNL      MEDICATIONS  (STANDING):  bisacodyl 5 milliGRAM(s) Oral two times a day  chlorhexidine 4% Liquid 1 Application(s) Topical once  dextrose 5%. 1000 milliLiter(s) (50 mL/Hr) IV Continuous <Continuous>  dextrose 50% Injectable 12.5 Gram(s) IV Push once  dextrose 50% Injectable 25 Gram(s) IV Push once  dextrose 50% Injectable 25 Gram(s) IV Push once  folic acid 1 milliGRAM(s) Oral daily  heparin  Injectable 5000 Unit(s) SubCutaneous every 8 hours  metoprolol succinate ER 50 milliGRAM(s) Oral daily  pantoprazole    Tablet 40 milliGRAM(s) Oral before breakfast  simvastatin 20 milliGRAM(s) Oral at bedtime  sodium chloride 0.9%. 1000 milliLiter(s) (75 mL/Hr) IV Continuous <Continuous>    MEDICATIONS  (PRN):  dextrose 40% Gel 15 Gram(s) Oral once PRN Blood Glucose LESS THAN 70 milliGRAM(s)/deciliter  docusate sodium 100 milliGRAM(s) Oral daily PRN Constipation  glucagon  Injectable 1 milliGRAM(s) IntraMuscular once PRN Glucose LESS THAN 70 milligrams/deciliter  lactulose Syrup 10 Gram(s) Oral two times a day PRN costipation  senna 1 Tablet(s) Oral daily PRN Constipation  traMADol 50 milliGRAM(s) Oral every 4 hours PRN Moderate Pain (4 - 6)      FAMILY HISTORY:  No pertinent family history in first degree relatives      Allergies    No Known Allergies    Intolerances        SOCIAL HISTORY:    [    ] Etoh  [    ] Smoking  [    ] Substance abuse     Home Environment:  [    ] Home Alone  [  x  ] Lives with Family SOUSE  [    ] Home Health Aid    Dwelling:  [  x  ] Apartment  [    ] Private House  [    ] Adult Home  [    ] Skilled Nursing Facility      [    ] Short Term  [    ] Long Term  [   x ] Stairs   DOWN T BASEMENT APT                        [    ] Elevator     FUNCTIONAL STATUS PTA: (Check all that apply)  Ambulation: [   x  ]Independent    [    ] Dependent     [    ] Non-Ambulatory  Assistive Device: [    ] SA Cane  [    ]  Q Cane  [ x   ] Walker  [    ]  Wheelchair  ADL : [   x ] Independent  [    ]  Dependent       Vital Signs Last 24 Hrs  T(C): 35.8 (09 Nov 2018 04:55), Max: 37.1 (08 Nov 2018 21:00)  T(F): 96.5 (09 Nov 2018 04:55), Max: 98.8 (08 Nov 2018 21:00)  HR: 70 (09 Nov 2018 04:55) (67 - 72)  BP: 174/73 (09 Nov 2018 04:55) (135/72 - 174/73)  BP(mean): --  RR: 18 (09 Nov 2018 04:55) (18 - 18)  SpO2: 97% (08 Nov 2018 19:35) (97% - 98%)      PHYSICAL EXAM: Alert & Oriented X3  GENERAL: NAD, well-groomed, well-developed  HEAD:  Atraumatic, Normocephalic  EYES: EOMI, PERRLA, conjunctiva and sclera clear  NECK: Supple, No JVD, Normal thyroid  CHEST/LUNG: Clear bilaterally; No rales, rhonchi, wheezing, or rubs  HEART: Regular rate and rhythm; No murmurs, rubs, or gallops  ABDOMEN: Soft, Nontender, Nondistended; Bowel sounds present  EXTREMITIES:  2+ Peripheral Pulses, No clubbing, cyanosis, or edema L Foot/ Ankle ecchymotic/swollen    NERVOUS SYSTEM:  Cranial Nerves 2-12 intact [ x   ] Abnormal  [    ]  ROM: WFL all extremities [    ]  Abnormal [ x    ]L ankle not tested secondary to pain  Motor Strength: WFL all extremities  [    ]  Abnormal [x    ]L foot limited secondary to pain  Sensation: intact to light touch [  x  ] Abnormal [        FUNCTIONAL STATUS:  Bed Mobility: [   ]  Independent [    ]  Supervision [  x  ]  Needs Assistance [  ]  N/A  Transfers: [    ]  Independent [    ]  Supervision [   x ]  Needs Assistance [    ]  N/A    Ambulation:  [    ]  Independent [    ]  Supervision [    ]  Needs Assistance [  x  ]  N/A   ADL:  [    ]   Independent [  x  ] Requires Assistance [    ] N/A       LABS:                        9.5    9.91  )-----------( 85       ( 08 Nov 2018 09:42 )             30.8     11-08    135  |  98  |  67<HH>  ----------------------------<  125<H>  6.0<HH>   |  19  |  3.2<H>    Ca    8.1<L>      08 Nov 2018 09:42  Phos  5.3     11-08  Mg     3.2     11-08    TPro  7.2  /  Alb  3.4<L>  /  TBili  0.3  /  DBili  x   /  AST  36  /  ALT  15  /  AlkPhos  68  11-07    PT/INR - ( 07 Nov 2018 19:30 )   PT: 10.80 sec;   INR: 0.94 ratio         PTT - ( 07 Nov 2018 19:30 )  PTT:21.4 sec      RADIOLOGY & ADDITIONAL STUDIES:

## 2018-11-09 NOTE — CHART NOTE - NSCHARTNOTEFT_GEN_A_CORE
Around 7PM, advised by RN that pt's heart rate is 140. RN reports pt was tachycardic in 130s around 4 PM, and EKG was requested. No medications were given at that time. Upon review of the EKG, it showed SVT with HR of 140. ORdered repeat stat EKG which again showed SVT with HR of 141. Pt is asymptomatic, and denies any SOB, palpitations, chest pain, dizziness, or lightheadedness. On questioning, she reports that she has a history of AFib and takes a medication that starts with a "C" at home. When asked if it's cardizem, she and her  both said yes. They do not remember the dose.     Will give the pt a cardizem push of 15mg now, and monitor the HR through the night. Will give a PO dose of cardizem in an hour after re-evaluating pt and VS.  will bring a copy of her home meds again tomorrow. Around 7PM, advised by RN that pt's heart rate is 140. RN reports pt was tachycardic in 130s around 4 PM, and EKG was requested. No medications were given at that time. Upon review of the EKG, it showed SVT with HR of 140, /65. ORdered repeat stat EKG which again showed SVT with HR of 141. Pt is asymptomatic, and denies any SOB, palpitations, chest pain, dizziness, or lightheadedness. On questioning, she reports that she has a history of AFib and takes a medication that starts with a "C" at home. When asked if it's cardizem, she and her  both said yes. They do not remember the dose.     Administered cardizem IVP of 15mg now, HR broke into 70s, /71.  Recycling the VS continuously through the next 1 hour, then per routine. Will hold off starting PO Cardizem now, but advised night resident to give if HR increases again.  and friend at bedside aware of the plan, and will  be bringing a copy of her home meds again tomorrow.

## 2018-11-09 NOTE — CONSULT NOTE ADULT - ASSESSMENT
IMPRESSION: Rehab of gait ab/ fall L ankle/foot pain -Sprain    PRECAUTIONS: [    ] Cardiac  [    ] Respiratory  [    ] Seizures [    ] Contact Isolation  [    ] Droplet Isolation  [    ] Other    Weight Bearing Status:     RECOMMENDATION: ORTHO EVAL- ? U SPLINT/ACE FOR SUPPORT- CLARIFY WT BEARING ON LLE                                 BOWEL PROGRAM  Out of Bed to Chair     DVT/Decubiti Prophylaxis    REHAB PLAN:     [ x    ] Bedside P/T 3-5 times a week   [     ] Bedside O/T  2-3 times a week   [     ] No Rehab Therapy Indicated   [     ]  Speech Therapy   Conditioning/ROM                                 ADL  Bed Mobility                                            Conditioning/ROM  Transfers                                                  Bed Mobility  Sitting /Standing Balance                      Transfers                                        Gait Training                                            Sitting/Standing Balance  Stair Training [   ]Applicable                 Home equipment Eval                                                                     Splinting  [   ] Only      GOALS:   ADL   [ x   ]   Independent         Transfers  [   x ] Independent            Ambulation  [   x  ] Independent     [  x   ] With device                            [    ]  CG                                               [    ]  CG                                                    [     ] CG                            [    ] Min A                                          [    ] Min A                                                [     ] Min  A          DISCHARGE PLAN:   [     ]  Good candidate for Intensive Rehabilitation/Hospital based                                             Will tolerate 3hrs Intensive Rehab Daily                                       [   x   ]  Short Term Rehab in Skilled Nursing Facility                                       [      ]  Home with Outpatient or  services                                         [      ]  Possible Candidate for Intensive Hospital based Rehab

## 2018-11-09 NOTE — CONSULT NOTE ADULT - SUBJECTIVE AND OBJECTIVE BOX
NEPHROLOGY CONSULTATION NOTE    74 yo wf with pmh as below, known to me with ckd stage 3-4, though not seen in office in few years, admitted with slip and fall without fracture.  Renal consulted for management of azotemia and electrolyte imbalance.  Baseline Cr 2's.  Renal function better since admission with IVF.    PAST MEDICAL & SURGICAL HISTORY:  Hyperlipidemia  HTN (hypertension)  DM (diabetes mellitus)  morbid obesity    Allergies:  No Known Allergies    Home Medications Reviewed    SOCIAL HISTORY:  Denies ETOH,Smoking,   FAMILY HISTORY:  No pertinent family history in first degree relatives        REVIEW OF SYSTEMS:  All other review of systems is negative unless indicated above.    PHYSICAL EXAM:  NAD  morbidly obese  moist mm  distant hs  decreased bs b/l  soft  trace edema    Hospital Medications:   MEDICATIONS  (STANDING):  dextrose 5%. 1000 milliLiter(s) (50 mL/Hr) IV Continuous <Continuous>  dextrose 50% Injectable 12.5 Gram(s) IV Push once  dextrose 50% Injectable 25 Gram(s) IV Push once  dextrose 50% Injectable 25 Gram(s) IV Push once  folic acid 1 milliGRAM(s) Oral daily  heparin  Injectable 5000 Unit(s) SubCutaneous every 8 hours  metoprolol succinate ER 50 milliGRAM(s) Oral daily  pantoprazole    Tablet 40 milliGRAM(s) Oral before breakfast  polyethylene glycol 3350 17 Gram(s) Oral two times a day  simvastatin 20 milliGRAM(s) Oral at bedtime  sodium chloride 0.9%. 1000 milliLiter(s) (75 mL/Hr) IV Continuous <Continuous>        VITALS:  T(F): 97.2 (11-09-18 @ 12:30), Max: 98.8 (11-08-18 @ 21:00)  HR: 72 (11-09-18 @ 12:30)  BP: 166/73 (11-09-18 @ 12:30)  RR: 18 (11-09-18 @ 12:30)  SpO2: 97% (11-08-18 @ 19:35)  Wt(kg): --    11-08 @ 07:01  -  11-09 @ 07:00  --------------------------------------------------------  IN: 0 mL / OUT: 900 mL / NET: -900 mL          LABS:  11-09    133<L>  |  97<L>  |  61<HH>  ----------------------------<  149<H>  4.6   |  18  |  2.7<H>    Ca    7.2<L>      09 Nov 2018 15:40  Phos  5.3     11-08  Mg     2.6     11-09    TPro  7.2  /  Alb  3.4<L>  /  TBili  0.3  /  DBili      /  AST  36  /  ALT  15  /  AlkPhos  68  11-07                          9.2    8.57  )-----------( 90       ( 09 Nov 2018 15:40 )             28.5       Urine Studies:    Potassium, Random Urine: 33 mmol/L (11-09 @ 02:53)  Sodium, Random Urine: 64.0 mmoL/L (11-09 @ 02:53)  Chloride, Random Urine: 44 (11-09 @ 02:53)  Protein/Creatinine Ratio Calculation: 2.8 Ratio (11-09 @ 02:53)  Creatinine, Random Urine: 79 mg/dL (11-09 @ 02:53)      RADIOLOGY & ADDITIONAL STUDIES:

## 2018-11-10 LAB
APPEARANCE UR: ABNORMAL
BACTERIA # UR AUTO: ABNORMAL /HPF
BILIRUB UR-MCNC: NEGATIVE — SIGNIFICANT CHANGE UP
COLOR SPEC: YELLOW — SIGNIFICANT CHANGE UP
COMMENT - URINE: SIGNIFICANT CHANGE UP
DIFF PNL FLD: NEGATIVE — SIGNIFICANT CHANGE UP
GLUCOSE BLDC GLUCOMTR-MCNC: 126 MG/DL — HIGH (ref 70–99)
GLUCOSE BLDC GLUCOMTR-MCNC: 140 MG/DL — HIGH (ref 70–99)
GLUCOSE BLDC GLUCOMTR-MCNC: 162 MG/DL — HIGH (ref 70–99)
GLUCOSE BLDC GLUCOMTR-MCNC: 186 MG/DL — HIGH (ref 70–99)
GLUCOSE BLDC GLUCOMTR-MCNC: 195 MG/DL — HIGH (ref 70–99)
GLUCOSE UR QL: 100 MG/DL
KETONES UR-MCNC: NEGATIVE — SIGNIFICANT CHANGE UP
LEUKOCYTE ESTERASE UR-ACNC: NEGATIVE — SIGNIFICANT CHANGE UP
NITRITE UR-MCNC: NEGATIVE — SIGNIFICANT CHANGE UP
PH UR: 8.5 — SIGNIFICANT CHANGE UP (ref 5–8)
PROT UR-MCNC: >=300 MG/DL
SP GR SPEC: 1.01 — SIGNIFICANT CHANGE UP (ref 1.01–1.03)
TRI-PHOS CRY UR QL COMP ASSIST: ABNORMAL /HPF
UROBILINOGEN FLD QL: 0.2 MG/DL — SIGNIFICANT CHANGE UP (ref 0.2–0.2)

## 2018-11-10 RX ORDER — SIMVASTATIN 20 MG/1
1 TABLET, FILM COATED ORAL
Qty: 0 | Refills: 0 | COMMUNITY

## 2018-11-10 RX ORDER — PIOGLITAZONE HYDROCHLORIDE 15 MG/1
1 TABLET ORAL
Qty: 0 | Refills: 0 | COMMUNITY

## 2018-11-10 RX ORDER — DEXTROSE 50 % IN WATER 50 %
12.5 SYRINGE (ML) INTRAVENOUS ONCE
Qty: 0 | Refills: 0 | Status: DISCONTINUED | OUTPATIENT
Start: 2018-11-10 | End: 2018-11-16

## 2018-11-10 RX ORDER — DEXTROSE 50 % IN WATER 50 %
15 SYRINGE (ML) INTRAVENOUS ONCE
Qty: 0 | Refills: 0 | Status: DISCONTINUED | OUTPATIENT
Start: 2018-11-10 | End: 2018-11-16

## 2018-11-10 RX ORDER — DILTIAZEM HCL 120 MG
1 CAPSULE, EXT RELEASE 24 HR ORAL
Qty: 0 | Refills: 0 | COMMUNITY

## 2018-11-10 RX ORDER — SODIUM CHLORIDE 9 MG/ML
1000 INJECTION, SOLUTION INTRAVENOUS
Qty: 0 | Refills: 0 | Status: DISCONTINUED | OUTPATIENT
Start: 2018-11-10 | End: 2018-11-16

## 2018-11-10 RX ORDER — INSULIN LISPRO 100/ML
4 VIAL (ML) SUBCUTANEOUS
Qty: 0 | Refills: 0 | Status: DISCONTINUED | OUTPATIENT
Start: 2018-11-10 | End: 2018-11-16

## 2018-11-10 RX ORDER — METOPROLOL TARTRATE 50 MG
1 TABLET ORAL
Qty: 0 | Refills: 0 | COMMUNITY

## 2018-11-10 RX ORDER — DEXTROSE 50 % IN WATER 50 %
25 SYRINGE (ML) INTRAVENOUS ONCE
Qty: 0 | Refills: 0 | Status: DISCONTINUED | OUTPATIENT
Start: 2018-11-10 | End: 2018-11-16

## 2018-11-10 RX ORDER — INSULIN GLARGINE 100 [IU]/ML
12 INJECTION, SOLUTION SUBCUTANEOUS AT BEDTIME
Qty: 0 | Refills: 0 | Status: DISCONTINUED | OUTPATIENT
Start: 2018-11-10 | End: 2018-11-16

## 2018-11-10 RX ORDER — TRAMADOL HYDROCHLORIDE 50 MG/1
1 TABLET ORAL
Qty: 0 | Refills: 0 | COMMUNITY

## 2018-11-10 RX ORDER — FOLIC ACID 0.8 MG
1 TABLET ORAL
Qty: 0 | Refills: 0 | COMMUNITY

## 2018-11-10 RX ORDER — GLUCAGON INJECTION, SOLUTION 0.5 MG/.1ML
1 INJECTION, SOLUTION SUBCUTANEOUS ONCE
Qty: 0 | Refills: 0 | Status: DISCONTINUED | OUTPATIENT
Start: 2018-11-10 | End: 2018-11-16

## 2018-11-10 RX ORDER — INSULIN LISPRO 100/ML
VIAL (ML) SUBCUTANEOUS
Qty: 0 | Refills: 0 | Status: DISCONTINUED | OUTPATIENT
Start: 2018-11-10 | End: 2018-11-16

## 2018-11-10 RX ADMIN — INSULIN GLARGINE 12 UNIT(S): 100 INJECTION, SOLUTION SUBCUTANEOUS at 21:40

## 2018-11-10 RX ADMIN — SIMVASTATIN 20 MILLIGRAM(S): 20 TABLET, FILM COATED ORAL at 21:04

## 2018-11-10 RX ADMIN — HEPARIN SODIUM 5000 UNIT(S): 5000 INJECTION INTRAVENOUS; SUBCUTANEOUS at 05:08

## 2018-11-10 RX ADMIN — HEPARIN SODIUM 5000 UNIT(S): 5000 INJECTION INTRAVENOUS; SUBCUTANEOUS at 21:03

## 2018-11-10 RX ADMIN — Medication 4 UNIT(S): at 18:14

## 2018-11-10 RX ADMIN — SODIUM CHLORIDE 75 MILLILITER(S): 9 INJECTION INTRAMUSCULAR; INTRAVENOUS; SUBCUTANEOUS at 21:38

## 2018-11-10 RX ADMIN — TRAMADOL HYDROCHLORIDE 50 MILLIGRAM(S): 50 TABLET ORAL at 21:04

## 2018-11-10 RX ADMIN — TRAMADOL HYDROCHLORIDE 50 MILLIGRAM(S): 50 TABLET ORAL at 21:30

## 2018-11-10 RX ADMIN — POLYETHYLENE GLYCOL 3350 17 GRAM(S): 17 POWDER, FOR SOLUTION ORAL at 05:08

## 2018-11-10 RX ADMIN — PANTOPRAZOLE SODIUM 40 MILLIGRAM(S): 20 TABLET, DELAYED RELEASE ORAL at 07:54

## 2018-11-10 RX ADMIN — Medication 50 MILLIGRAM(S): at 05:09

## 2018-11-10 RX ADMIN — Medication 1: at 18:14

## 2018-11-10 RX ADMIN — HEPARIN SODIUM 5000 UNIT(S): 5000 INJECTION INTRAVENOUS; SUBCUTANEOUS at 13:25

## 2018-11-10 RX ADMIN — Medication 1 MILLIGRAM(S): at 11:11

## 2018-11-10 RX ADMIN — SODIUM CHLORIDE 75 MILLILITER(S): 9 INJECTION INTRAMUSCULAR; INTRAVENOUS; SUBCUTANEOUS at 07:54

## 2018-11-10 NOTE — CHART NOTE - NSCHARTNOTEFT_GEN_A_CORE
Called by nurse around 3PM that patient's HR was 140. Patient was asymptomatic - denies palpitation, chest pain or sob. Blood pressure was 145/79. Stat EKG was taken and it showed SVT. Patient had a similar episode last night. 15mg of Cardizem IV push was administered and patient HR went down to 70s with a BP of 176/79.     A copy of patient's home meds was brought in this morning which showed patient is on PO Cardizem 90mg BID. The standing order was placed by resident this morning but patient never received AM dose.     Patient will receive evening dose of PO Cardizem 90mg at 6pm tonight - about one hour after the IV push of 15mg Cardizem was administered.

## 2018-11-10 NOTE — PROGRESS NOTE ADULT - ASSESSMENT
74 y/o morbidly obese female patient with PMHx of HTN, DM, DLD and CKD p/w mechanical fall.      #Mechanical fall in the setting of morbid obesity and inability to ambulate   No evidence of fracture at the xray but area tender and swollen.   Pain control now.   Has renal failure.     pain control with tramadol. need frequent renal function check   PT and rehab  patient baseline status - bed bound     #LALO on  CKD with hyperkalemia - Nephrology appreciated and following   unknown baseline  urine electrolytes   monitor BMP daily   IV fluids    #Hyponatremia   proabably 2/2 to dehydration no eating enough   monitor BMP daily   IV fluids    #DM  insulin protocol if FS more than 180  monitor FS    #HTN and DLD  c/w losartan and simvastatin    # Hyperkalemia - Correction     #Others  Diet, Consistent carb diet  DVT and GI PPX  from home  Full code

## 2018-11-11 LAB
APPEARANCE UR: ABNORMAL
BACTERIA # UR AUTO: ABNORMAL /HPF
BILIRUB UR-MCNC: NEGATIVE — SIGNIFICANT CHANGE UP
COLOR SPEC: YELLOW — SIGNIFICANT CHANGE UP
DIFF PNL FLD: NEGATIVE — SIGNIFICANT CHANGE UP
EPI CELLS # UR: ABNORMAL /HPF
GLUCOSE BLDC GLUCOMTR-MCNC: 106 MG/DL — HIGH (ref 70–99)
GLUCOSE BLDC GLUCOMTR-MCNC: 119 MG/DL — HIGH (ref 70–99)
GLUCOSE BLDC GLUCOMTR-MCNC: 152 MG/DL — HIGH (ref 70–99)
GLUCOSE UR QL: 100 MG/DL
KETONES UR-MCNC: NEGATIVE — SIGNIFICANT CHANGE UP
LEUKOCYTE ESTERASE UR-ACNC: ABNORMAL
NITRITE UR-MCNC: NEGATIVE — SIGNIFICANT CHANGE UP
PH UR: 8.5 — SIGNIFICANT CHANGE UP (ref 5–8)
PROT UR-MCNC: >=300 MG/DL
SP GR SPEC: 1.01 — SIGNIFICANT CHANGE UP (ref 1.01–1.03)
TRI-PHOS CRY UR QL COMP ASSIST: ABNORMAL /HPF
UROBILINOGEN FLD QL: 0.2 MG/DL — SIGNIFICANT CHANGE UP (ref 0.2–0.2)
WBC UR QL: SIGNIFICANT CHANGE UP /HPF

## 2018-11-11 RX ORDER — CHLORHEXIDINE GLUCONATE 213 G/1000ML
1 SOLUTION TOPICAL
Qty: 0 | Refills: 0 | Status: DISCONTINUED | OUTPATIENT
Start: 2018-11-11 | End: 2018-11-16

## 2018-11-11 RX ADMIN — Medication 1: at 17:02

## 2018-11-11 RX ADMIN — HEPARIN SODIUM 5000 UNIT(S): 5000 INJECTION INTRAVENOUS; SUBCUTANEOUS at 15:00

## 2018-11-11 RX ADMIN — INSULIN GLARGINE 12 UNIT(S): 100 INJECTION, SOLUTION SUBCUTANEOUS at 21:16

## 2018-11-11 RX ADMIN — HEPARIN SODIUM 5000 UNIT(S): 5000 INJECTION INTRAVENOUS; SUBCUTANEOUS at 05:04

## 2018-11-11 RX ADMIN — Medication 50 MILLIGRAM(S): at 05:04

## 2018-11-11 RX ADMIN — Medication 4 UNIT(S): at 08:02

## 2018-11-11 RX ADMIN — Medication 1 MILLIGRAM(S): at 12:18

## 2018-11-11 RX ADMIN — PANTOPRAZOLE SODIUM 40 MILLIGRAM(S): 20 TABLET, DELAYED RELEASE ORAL at 05:04

## 2018-11-11 RX ADMIN — POLYETHYLENE GLYCOL 3350 17 GRAM(S): 17 POWDER, FOR SOLUTION ORAL at 17:03

## 2018-11-11 RX ADMIN — Medication 4 UNIT(S): at 12:18

## 2018-11-11 RX ADMIN — TRAMADOL HYDROCHLORIDE 50 MILLIGRAM(S): 50 TABLET ORAL at 21:45

## 2018-11-11 RX ADMIN — HEPARIN SODIUM 5000 UNIT(S): 5000 INJECTION INTRAVENOUS; SUBCUTANEOUS at 21:16

## 2018-11-11 RX ADMIN — SIMVASTATIN 20 MILLIGRAM(S): 20 TABLET, FILM COATED ORAL at 21:16

## 2018-11-11 RX ADMIN — TRAMADOL HYDROCHLORIDE 50 MILLIGRAM(S): 50 TABLET ORAL at 21:15

## 2018-11-11 RX ADMIN — Medication 4 UNIT(S): at 17:02

## 2018-11-11 NOTE — PROGRESS NOTE ADULT - ASSESSMENT
72 y/o morbidly obese female patient with PMHx of HTN, DM, DLD and CKD p/w mechanical fall.      #Mechanical fall in the setting of morbid obesity and inability to ambulate   No evidence of fracture at the xray but area tender and swollen.   Pain control now.   Has renal failure.     pain control with tramadol. need frequent renal function check   PT and rehab  patient baseline status - bed bound     #LALO on  CKD with hyperkalemia - Nephrology appreciated and following   unknown baseline  urine electrolytes   monitor BMP daily   IV fluids    #Hyponatremia   proabably 2/2 to dehydration no eating enough   monitor BMP daily   IV fluids    #DM  insulin protocol if FS more than 180  monitor FS    #HTN and DLD  c/w losartan and simvastatin    # Hyperkalemia - Correction     #Others  Diet, Consistent carb diet  DVT and GI PPX  from home  Full code

## 2018-11-11 NOTE — PROGRESS NOTE ADULT - ASSESSMENT
72 y/o morbidly obese female patient with PMHx of HTN, DM, DLD and CKD p/w mechanical fall.      #Mechanical fall in the setting of morbid obesity and inability to ambulate   - 11-11-18: Pt continues to c/o pain to left foot/ankle, however no evidence of fracture per ED, Ankle xray was negative. C/w pain cotrol with tramadol. Physiatry recommends STR in SNF, will follow up with . Pt had one episode of SVT two nights ago --> medical records were faxed and pt was found to have a history of Afib on caridemz 90 q12 --> this was started yesterday, however pt did not receive her AM does and pt had SVT episode last night. Pt has been getting her usual dose today and pt has not had an episode since.     #LALO vs CKD  - 11-9-18: Received medical records from pt's Doctors on call service --> baseline Creatnine ~2, thus pt appears to have LALO, likely due to dehydration. Continue to monitor electrolytes and creatnine, and continue to give IV fluids    #Hyponatremia   - 11-9-18: Pt continues to have low Na = 132 today, proabably 2/2 to dehdyration, will continue to monitor BMP, and will give IV fluids    #DM  insulin protocol if FS more than 180  monitor FS    #HTN and DLD  c/w losartan and simvstatin    #Others  Diet, Consistent carb diet  DVT and GI PPX  from home  Full code 72 y/o morbidly obese female patient with PMHx of HTN, DM, DLD and CKD p/w mechanical fall.      #Mechanical fall in the setting of morbid obesity and inability to ambulate   - 11-11-18: Pt continues to c/o pain to left foot/ankle, however no evidence of fracture per ED, Ankle xray was negative. C/w pain cotrol with tramadol. Physiatry recommends STR in SNF, will follow up with . Pt had one episode of SVT two nights ago --> medical records were faxed and pt was found to have a history of Afib on caridemz 90 q12 --> this was started yesterday, however pt did not receive her AM does and pt had SVT episode last night. Pt has been getting her usual dose today and pt has not had an episode since.     #LALO vs CKD  - 11-11-18: Received medical records from pt's Doctors on call service --> baseline Creatnine ~2, thus pt appears to have LALO, likely due to dehydration. Continue to monitor electrolytes and creatnine. Per nephro recommendations: NS @ 75 cc/hr another 24 hours only, hold losartan, check UA and UC and S, check renal sono --> will f/u - though obesity may make it a difficult study. If bp elevation worsen - can add nifedipine xl 30mg po qd.    #Hyponatremia   - 11-9-18: Pt continues to have low Na = 132 today, proabably 2/2 to dehdyration, will continue to monitor BMP, and will give IV fluids    #DM  insulin protocol if FS more than 180  monitor FS    #HTN and DLD  c/w losartan and simvstatin    #Others  Diet, Consistent carb diet  DVT and GI PPX  from home  Full code 74 y/o morbidly obese female patient with PMHx of HTN, DM, DLD and CKD p/w mechanical fall.      #Mechanical fall in the setting of morbid obesity and inability to ambulate   - 11-11-18: Pt continues to c/o pain to left foot/ankle, however no evidence of fracture per ED, Ankle xray was negative. C/w pain cotrol with tramadol. Physiatry recommends STR in SNF, will follow up with . Pt had one episode of SVT two nights ago --> medical records were faxed and pt was found to have a history of Afib on caridemz 90 q12 --> this was started yesterday, however pt did not receive her AM does and pt had SVT episode last night. Pt has been getting her usual dose today and pt has not had an episode since.     #LALO vs CKD  - 11-11-18: Received medical records from pt's [facility] on call service --> baseline Creatnine ~2, thus pt appears to have LALO, likely due to dehydration. Continue to monitor electrolytes and creatnine. Per nephro recommendations: NS @ 75 cc/hr another 24 hours only --> fluids discontinued today. Will continue to hold losartan, will f/u with UA and UC and S, check renal sono, though obesity may make it a difficult study. If bp elevation worsen - can add nifedipine xl 30mg po qd.    #Hyponatremia   - 11-9-18: Pt continues to have low Na = 132 today, proabably 2/2 to dehdyration, will continue to monitor BMP, and will give IV fluids    #DM  insulin protocol if FS more than 180  monitor FS    #HTN and DLD  c/w losartan and simvstatin    #Others  Diet, Consistent carb diet  DVT and GI PPX  from home  Full code

## 2018-11-12 LAB
-  AMIKACIN: SIGNIFICANT CHANGE UP
-  AMOXICILLIN/CLAVULANIC ACID: SIGNIFICANT CHANGE UP
-  AMPICILLIN/SULBACTAM: SIGNIFICANT CHANGE UP
-  AMPICILLIN: SIGNIFICANT CHANGE UP
-  AZTREONAM: SIGNIFICANT CHANGE UP
-  CEFAZOLIN: SIGNIFICANT CHANGE UP
-  CEFEPIME: SIGNIFICANT CHANGE UP
-  CEFOXITIN: SIGNIFICANT CHANGE UP
-  CEFTRIAXONE: SIGNIFICANT CHANGE UP
-  CIPROFLOXACIN: SIGNIFICANT CHANGE UP
-  ERTAPENEM: SIGNIFICANT CHANGE UP
-  GENTAMICIN: SIGNIFICANT CHANGE UP
-  LEVOFLOXACIN: SIGNIFICANT CHANGE UP
-  MEROPENEM: SIGNIFICANT CHANGE UP
-  NITROFURANTOIN: SIGNIFICANT CHANGE UP
-  PIPERACILLIN/TAZOBACTAM: SIGNIFICANT CHANGE UP
-  TOBRAMYCIN: SIGNIFICANT CHANGE UP
-  TRIMETHOPRIM/SULFAMETHOXAZOLE: SIGNIFICANT CHANGE UP
ANION GAP SERPL CALC-SCNC: 17 MMOL/L — HIGH (ref 7–14)
BASOPHILS # BLD AUTO: 0.03 K/UL — SIGNIFICANT CHANGE UP (ref 0–0.2)
BASOPHILS NFR BLD AUTO: 0.4 % — SIGNIFICANT CHANGE UP (ref 0–1)
BUN SERPL-MCNC: 59 MG/DL — HIGH (ref 10–20)
CALCIUM SERPL-MCNC: 7.9 MG/DL — LOW (ref 8.5–10.1)
CHLORIDE SERPL-SCNC: 99 MMOL/L — SIGNIFICANT CHANGE UP (ref 98–110)
CO2 SERPL-SCNC: 17 MMOL/L — SIGNIFICANT CHANGE UP (ref 17–32)
CREAT SERPL-MCNC: 2.6 MG/DL — HIGH (ref 0.7–1.5)
CULTURE RESULTS: SIGNIFICANT CHANGE UP
EOSINOPHIL # BLD AUTO: 0.39 K/UL — SIGNIFICANT CHANGE UP (ref 0–0.7)
EOSINOPHIL NFR BLD AUTO: 5.7 % — SIGNIFICANT CHANGE UP (ref 0–8)
GLUCOSE BLDC GLUCOMTR-MCNC: 115 MG/DL — HIGH (ref 70–99)
GLUCOSE BLDC GLUCOMTR-MCNC: 160 MG/DL — HIGH (ref 70–99)
GLUCOSE BLDC GLUCOMTR-MCNC: 166 MG/DL — HIGH (ref 70–99)
GLUCOSE BLDC GLUCOMTR-MCNC: 198 MG/DL — HIGH (ref 70–99)
GLUCOSE SERPL-MCNC: 156 MG/DL — HIGH (ref 70–99)
HCT VFR BLD CALC: 27 % — LOW (ref 37–47)
HGB BLD-MCNC: 8.6 G/DL — LOW (ref 12–16)
IMM GRANULOCYTES NFR BLD AUTO: 0.9 % — HIGH (ref 0.1–0.3)
LYMPHOCYTES # BLD AUTO: 0.83 K/UL — LOW (ref 1.2–3.4)
LYMPHOCYTES # BLD AUTO: 12.2 % — LOW (ref 20.5–51.1)
MCHC RBC-ENTMCNC: 30.5 PG — SIGNIFICANT CHANGE UP (ref 27–31)
MCHC RBC-ENTMCNC: 31.9 G/DL — LOW (ref 32–37)
MCV RBC AUTO: 95.7 FL — SIGNIFICANT CHANGE UP (ref 81–99)
METHOD TYPE: SIGNIFICANT CHANGE UP
MONOCYTES # BLD AUTO: 0.42 K/UL — SIGNIFICANT CHANGE UP (ref 0.1–0.6)
MONOCYTES NFR BLD AUTO: 6.2 % — SIGNIFICANT CHANGE UP (ref 1.7–9.3)
NEUTROPHILS # BLD AUTO: 5.07 K/UL — SIGNIFICANT CHANGE UP (ref 1.4–6.5)
NEUTROPHILS NFR BLD AUTO: 74.6 % — SIGNIFICANT CHANGE UP (ref 42.2–75.2)
NRBC # BLD: 0 /100 WBCS — SIGNIFICANT CHANGE UP (ref 0–0)
ORGANISM # SPEC MICROSCOPIC CNT: SIGNIFICANT CHANGE UP
ORGANISM # SPEC MICROSCOPIC CNT: SIGNIFICANT CHANGE UP
PLATELET # BLD AUTO: 118 K/UL — LOW (ref 130–400)
POTASSIUM SERPL-MCNC: 5.4 MMOL/L — HIGH (ref 3.5–5)
POTASSIUM SERPL-SCNC: 5.4 MMOL/L — HIGH (ref 3.5–5)
RBC # BLD: 2.82 M/UL — LOW (ref 4.2–5.4)
RBC # FLD: 13.6 % — SIGNIFICANT CHANGE UP (ref 11.5–14.5)
SODIUM SERPL-SCNC: 133 MMOL/L — LOW (ref 135–146)
SPECIMEN SOURCE: SIGNIFICANT CHANGE UP
WBC # BLD: 6.8 K/UL — SIGNIFICANT CHANGE UP (ref 4.8–10.8)
WBC # FLD AUTO: 6.8 K/UL — SIGNIFICANT CHANGE UP (ref 4.8–10.8)

## 2018-11-12 RX ORDER — SODIUM POLYSTYRENE SULFONATE 4.1 MEQ/G
30 POWDER, FOR SUSPENSION ORAL ONCE
Qty: 0 | Refills: 0 | Status: COMPLETED | OUTPATIENT
Start: 2018-11-12 | End: 2018-11-12

## 2018-11-12 RX ADMIN — HEPARIN SODIUM 5000 UNIT(S): 5000 INJECTION INTRAVENOUS; SUBCUTANEOUS at 14:22

## 2018-11-12 RX ADMIN — PANTOPRAZOLE SODIUM 40 MILLIGRAM(S): 20 TABLET, DELAYED RELEASE ORAL at 05:46

## 2018-11-12 RX ADMIN — TRAMADOL HYDROCHLORIDE 50 MILLIGRAM(S): 50 TABLET ORAL at 16:31

## 2018-11-12 RX ADMIN — Medication 4 UNIT(S): at 17:01

## 2018-11-12 RX ADMIN — Medication 4 UNIT(S): at 08:02

## 2018-11-12 RX ADMIN — TRAMADOL HYDROCHLORIDE 50 MILLIGRAM(S): 50 TABLET ORAL at 21:58

## 2018-11-12 RX ADMIN — POLYETHYLENE GLYCOL 3350 17 GRAM(S): 17 POWDER, FOR SOLUTION ORAL at 17:02

## 2018-11-12 RX ADMIN — Medication 4 UNIT(S): at 12:15

## 2018-11-12 RX ADMIN — HEPARIN SODIUM 5000 UNIT(S): 5000 INJECTION INTRAVENOUS; SUBCUTANEOUS at 05:47

## 2018-11-12 RX ADMIN — Medication 1 MILLIGRAM(S): at 11:26

## 2018-11-12 RX ADMIN — CHLORHEXIDINE GLUCONATE 1 APPLICATION(S): 213 SOLUTION TOPICAL at 05:47

## 2018-11-12 RX ADMIN — INSULIN GLARGINE 12 UNIT(S): 100 INJECTION, SOLUTION SUBCUTANEOUS at 21:57

## 2018-11-12 RX ADMIN — Medication 1: at 17:00

## 2018-11-12 RX ADMIN — SIMVASTATIN 20 MILLIGRAM(S): 20 TABLET, FILM COATED ORAL at 21:57

## 2018-11-12 RX ADMIN — TRAMADOL HYDROCHLORIDE 50 MILLIGRAM(S): 50 TABLET ORAL at 17:01

## 2018-11-12 RX ADMIN — Medication 50 MILLIGRAM(S): at 06:08

## 2018-11-12 RX ADMIN — HEPARIN SODIUM 5000 UNIT(S): 5000 INJECTION INTRAVENOUS; SUBCUTANEOUS at 21:57

## 2018-11-12 RX ADMIN — Medication 1: at 12:15

## 2018-11-12 RX ADMIN — SODIUM POLYSTYRENE SULFONATE 30 GRAM(S): 4.1 POWDER, FOR SUSPENSION ORAL at 14:27

## 2018-11-12 NOTE — PROGRESS NOTE ADULT - ASSESSMENT
LALO  - resolved  CKD stage 3-4  - baseline Cr - 2's  near nephrotic range proteinuria  proteus bacteriuria   hyperkalemia  -mild  hyponatremia  morbid obesity  fall / gait disturbance  HTN  anemia    plan:    off ivf  keep off losartan  no need for kayexalate unless K+ > 5.8  avoid NSAIDs   no abx  pt / rehab  ckd education  full code

## 2018-11-12 NOTE — PROGRESS NOTE ADULT - ASSESSMENT
72 y/o morbidly obese female patient with PMHx of HTN, DM, DLD and CKD p/w mechanical fall.      #Mechanical fall in the setting of morbid obesity and inability to ambulate   - 11-12-18: Pt continues to c/o pain to left foot/ankle, however no evidence of fracture per ED, Ankle xray was negative. C/w pain cotrol with tramadol. Physiatry recommends STR in SNF, will follow up with . PT service was called, they will be seeing pt either today or early tomorrow morning. Ortho consult pending.    #LALO vs CKD  - 11-12-18: Received medical records from pt's Doctors on call service --> baseline Creatnine ~2, thus pt appears to have LALO, likely due to dehydration. Continue to monitor electrolytes and creatnine. Per nephro recommendations, Will continue to hold losartan, UA was negative yet urine culture was positive for proteus, however, likely contaminated due to female condom catheter. Will f/u with urine culture. If bp elevation worsen - can add nifedipine xl 30mg po qd.    #SVT  -1-10-18: Pt had one episode of SVT two nights ago --> medical records were faxed and pt was found to have a history of Afib on caridemz 90 q12 --> this was started yesterday, however pt did not receive her AM does and pt had SVT episode last night. Pt has been getting her usual dose today and pt has not had an episode since.     #Hyponatremia   - 11-9-18: Pt continues to have low Na = 132 today, proabably 2/2 to dehdyration, will continue to monitor BMP, and will give IV fluids    #DM  insulin protocol if FS more than 180  monitor FS    #HTN and DLD  c/w losartan and simvstatin    #Others  Diet, Consistent carb diet  DVT and GI PPX  from home  Full code

## 2018-11-13 LAB
ANION GAP SERPL CALC-SCNC: 18 MMOL/L — HIGH (ref 7–14)
BASOPHILS # BLD AUTO: 0.03 K/UL — SIGNIFICANT CHANGE UP (ref 0–0.2)
BASOPHILS NFR BLD AUTO: 0.5 % — SIGNIFICANT CHANGE UP (ref 0–1)
BUN SERPL-MCNC: 63 MG/DL — CRITICAL HIGH (ref 10–20)
CALCIUM SERPL-MCNC: 7.9 MG/DL — LOW (ref 8.5–10.1)
CHLORIDE SERPL-SCNC: 98 MMOL/L — SIGNIFICANT CHANGE UP (ref 98–110)
CO2 SERPL-SCNC: 17 MMOL/L — SIGNIFICANT CHANGE UP (ref 17–32)
CREAT SERPL-MCNC: 2.6 MG/DL — HIGH (ref 0.7–1.5)
CULTURE RESULTS: SIGNIFICANT CHANGE UP
EOSINOPHIL # BLD AUTO: 0.33 K/UL — SIGNIFICANT CHANGE UP (ref 0–0.7)
EOSINOPHIL NFR BLD AUTO: 5 % — SIGNIFICANT CHANGE UP (ref 0–8)
GLUCOSE BLDC GLUCOMTR-MCNC: 116 MG/DL — HIGH (ref 70–99)
GLUCOSE BLDC GLUCOMTR-MCNC: 135 MG/DL — HIGH (ref 70–99)
GLUCOSE BLDC GLUCOMTR-MCNC: 138 MG/DL — HIGH (ref 70–99)
GLUCOSE BLDC GLUCOMTR-MCNC: 138 MG/DL — HIGH (ref 70–99)
GLUCOSE SERPL-MCNC: 110 MG/DL — HIGH (ref 70–99)
HCT VFR BLD CALC: 26.3 % — LOW (ref 37–47)
HGB BLD-MCNC: 8.3 G/DL — LOW (ref 12–16)
IMM GRANULOCYTES NFR BLD AUTO: 0.9 % — HIGH (ref 0.1–0.3)
LYMPHOCYTES # BLD AUTO: 0.93 K/UL — LOW (ref 1.2–3.4)
LYMPHOCYTES # BLD AUTO: 14 % — LOW (ref 20.5–51.1)
MCHC RBC-ENTMCNC: 30 PG — SIGNIFICANT CHANGE UP (ref 27–31)
MCHC RBC-ENTMCNC: 31.6 G/DL — LOW (ref 32–37)
MCV RBC AUTO: 94.9 FL — SIGNIFICANT CHANGE UP (ref 81–99)
MONOCYTES # BLD AUTO: 0.49 K/UL — SIGNIFICANT CHANGE UP (ref 0.1–0.6)
MONOCYTES NFR BLD AUTO: 7.4 % — SIGNIFICANT CHANGE UP (ref 1.7–9.3)
NEUTROPHILS # BLD AUTO: 4.81 K/UL — SIGNIFICANT CHANGE UP (ref 1.4–6.5)
NEUTROPHILS NFR BLD AUTO: 72.2 % — SIGNIFICANT CHANGE UP (ref 42.2–75.2)
NRBC # BLD: 0 /100 WBCS — SIGNIFICANT CHANGE UP (ref 0–0)
PLATELET # BLD AUTO: 132 K/UL — SIGNIFICANT CHANGE UP (ref 130–400)
POTASSIUM SERPL-MCNC: 5.1 MMOL/L — HIGH (ref 3.5–5)
POTASSIUM SERPL-SCNC: 5.1 MMOL/L — HIGH (ref 3.5–5)
RBC # BLD: 2.77 M/UL — LOW (ref 4.2–5.4)
RBC # FLD: 13.5 % — SIGNIFICANT CHANGE UP (ref 11.5–14.5)
SODIUM SERPL-SCNC: 133 MMOL/L — LOW (ref 135–146)
SPECIMEN SOURCE: SIGNIFICANT CHANGE UP
WBC # BLD: 6.65 K/UL — SIGNIFICANT CHANGE UP (ref 4.8–10.8)
WBC # FLD AUTO: 6.65 K/UL — SIGNIFICANT CHANGE UP (ref 4.8–10.8)

## 2018-11-13 RX ADMIN — CHLORHEXIDINE GLUCONATE 1 APPLICATION(S): 213 SOLUTION TOPICAL at 05:33

## 2018-11-13 RX ADMIN — TRAMADOL HYDROCHLORIDE 50 MILLIGRAM(S): 50 TABLET ORAL at 01:20

## 2018-11-13 RX ADMIN — POLYETHYLENE GLYCOL 3350 17 GRAM(S): 17 POWDER, FOR SOLUTION ORAL at 17:26

## 2018-11-13 RX ADMIN — PANTOPRAZOLE SODIUM 40 MILLIGRAM(S): 20 TABLET, DELAYED RELEASE ORAL at 05:32

## 2018-11-13 RX ADMIN — Medication 4 UNIT(S): at 08:04

## 2018-11-13 RX ADMIN — HEPARIN SODIUM 5000 UNIT(S): 5000 INJECTION INTRAVENOUS; SUBCUTANEOUS at 05:32

## 2018-11-13 RX ADMIN — Medication 4 UNIT(S): at 12:19

## 2018-11-13 RX ADMIN — HEPARIN SODIUM 5000 UNIT(S): 5000 INJECTION INTRAVENOUS; SUBCUTANEOUS at 14:22

## 2018-11-13 RX ADMIN — HEPARIN SODIUM 5000 UNIT(S): 5000 INJECTION INTRAVENOUS; SUBCUTANEOUS at 21:49

## 2018-11-13 RX ADMIN — Medication 50 MILLIGRAM(S): at 05:32

## 2018-11-13 RX ADMIN — INSULIN GLARGINE 12 UNIT(S): 100 INJECTION, SOLUTION SUBCUTANEOUS at 21:48

## 2018-11-13 RX ADMIN — TRAMADOL HYDROCHLORIDE 50 MILLIGRAM(S): 50 TABLET ORAL at 15:00

## 2018-11-13 RX ADMIN — TRAMADOL HYDROCHLORIDE 50 MILLIGRAM(S): 50 TABLET ORAL at 01:50

## 2018-11-13 RX ADMIN — SIMVASTATIN 20 MILLIGRAM(S): 20 TABLET, FILM COATED ORAL at 21:48

## 2018-11-13 RX ADMIN — Medication 4 UNIT(S): at 17:25

## 2018-11-13 RX ADMIN — POLYETHYLENE GLYCOL 3350 17 GRAM(S): 17 POWDER, FOR SOLUTION ORAL at 05:33

## 2018-11-13 RX ADMIN — TRAMADOL HYDROCHLORIDE 50 MILLIGRAM(S): 50 TABLET ORAL at 14:28

## 2018-11-13 RX ADMIN — Medication 1 MILLIGRAM(S): at 12:19

## 2018-11-13 NOTE — DISCHARGE NOTE ADULT - OTHER SIGNIFICANT FINDINGS
INTERPRETATION:  Clinical History / Reason for exam: Ankle pain    2 views of the left ankle.    Comparison: November 7, 2018    Findings/  impression:    Images are limited secondary to difficulty positioning (unable to   straighten leg) and overlying gown.     No definite acute displaced fracture or dislocation.    There are degenerative changes of the hindfoot and midfoot joints.    There is a 6 mm calcaneal heel spur.    There is small tibiotalar joint effusion.    There is dorsal foot, ankle and distal calf soft tissue swelling.  -----------------------------------------------------------------------------------------------    INTERPRETATION:  Clinical History / Reason for exam: Foot pain.    Technique: 2 views of the left foot. Quality of study severely limited   secondary to patient unable to straighten leg. Artifact overlies the   lateral column limiting assessment for fractures.    Comparison: None.    FINDINGS:  No definitive fracture or dislocation. Diffuse soft tissue swelling   extending to the ankle.    Moderate/ severe degenerative changes of the midfoot. Possible claw   deformities of the 2nd-5th digits.    6 mm calcaneal heel spur.    No definitive ankle joint effusion.    IMPRESSION:  Limited evaluation with artifact overlying the lateral column    Osteopenia; No displaced fracture dislocation along the first and second   ray    Diffuse soft tissue swelling extending to the ankle.    Moderate-to-severe degenerative changes of the midfoot.

## 2018-11-13 NOTE — DISCHARGE NOTE ADULT - PLAN OF CARE
Traumatic work-up You were hospitalized after you had a fall at home. Upon admission, you were worked up for any traumatic injury where you received an initial Xray of your foot and were found not to have any fractures. The orthopedic doctor saw you and got a second xray of your foot and ankle and they also found no fractures. It is likely that you sprained your ankle, and you will be discharged to a short term rehab facility where they will help you gain back your strength and help you with your ability to walk. Please continue to work with the rehab facility, and follow up with your primary care doctor within 1 week of your discharge from the rehab facility. Please also continue your home medications as prescribed. During your hospitalization, you were found to have an acute injury to your kidney that was indicated by an increase in your level of Creatinine in your blood. However, you chronically have elevated levels of Creatinine. Although Creatinine was elevated more than usual upon your admission, your levels improved with IV fluid hydration, and we recommend making sure that you drink plenty of water to keep your kidneys from further injury. Please follow up with your nephrologist within 1 week of your discharge from your rehab facility in regards to this matter. Hyperkalemia During your hospitalization, your potassium levels were elevated. You were given a medication called Kayexelate which is a medicine that brings Potassium levels down in the blood. Your potassium is likely elevated due to your chronic kidney disease, and so it is recommended that you STOP taking your blood pressure medication called LOSARTAN, as this is a medication that can increase potassium, as well as cause injury to your kidneys. Please stop taking this medication, and follow up with your primary care doctor and cardiologist within 1 week of discharge.

## 2018-11-13 NOTE — DISCHARGE NOTE ADULT - CARE PLAN
Principal Discharge DX:	Fall, initial encounter  Goal:	Traumatic work-up  Assessment and plan of treatment:	You were hospitalized after you had a fall at home. Upon admission, you were worked up for any traumatic injury where you received an initial Xray of your foot and were found not to have any fractures. The orthopedic doctor saw you and got a second xray of your foot and ankle and they also found no fractures. It is likely that you sprained your ankle, and you will be discharged to a short term rehab facility where they will help you gain back your strength and help you with your ability to walk. Please continue to work with the rehab facility, and follow up with your primary care doctor within 1 week of your discharge from the rehab facility. Please also continue your home medications as prescribed.  Secondary Diagnosis:	Acute renal failure, unspecified acute renal failure type Principal Discharge DX:	Fall, initial encounter  Goal:	Traumatic work-up  Assessment and plan of treatment:	You were hospitalized after you had a fall at home. Upon admission, you were worked up for any traumatic injury where you received an initial Xray of your foot and were found not to have any fractures. The orthopedic doctor saw you and got a second xray of your foot and ankle and they also found no fractures. It is likely that you sprained your ankle, and you will be discharged to a short term rehab facility where they will help you gain back your strength and help you with your ability to walk. Please continue to work with the rehab facility, and follow up with your primary care doctor within 1 week of your discharge from the rehab facility. Please also continue your home medications as prescribed.  Secondary Diagnosis:	Acute renal failure, unspecified acute renal failure type  Assessment and plan of treatment:	During your hospitalization, you were found to have an acute injury to your kidney that was indicated by an increase in your level of Creatinine in your blood. However, you chronically have elevated levels of Creatinine. Although Creatinine was elevated more than usual upon your admission, your levels improved with IV fluid hydration, and we recommend making sure that you drink plenty of water to keep your kidneys from further injury. Please follow up with your nephrologist within 1 week of your discharge from your rehab facility in regards to this matter. Principal Discharge DX:	Fall, initial encounter  Goal:	Traumatic work-up  Assessment and plan of treatment:	You were hospitalized after you had a fall at home. Upon admission, you were worked up for any traumatic injury where you received an initial Xray of your foot and were found not to have any fractures. The orthopedic doctor saw you and got a second xray of your foot and ankle and they also found no fractures. It is likely that you sprained your ankle, and you will be discharged to a short term rehab facility where they will help you gain back your strength and help you with your ability to walk. Please continue to work with the rehab facility, and follow up with your primary care doctor within 1 week of your discharge from the rehab facility. Please also continue your home medications as prescribed.  Secondary Diagnosis:	Acute renal failure, unspecified acute renal failure type  Assessment and plan of treatment:	During your hospitalization, you were found to have an acute injury to your kidney that was indicated by an increase in your level of Creatinine in your blood. However, you chronically have elevated levels of Creatinine. Although Creatinine was elevated more than usual upon your admission, your levels improved with IV fluid hydration, and we recommend making sure that you drink plenty of water to keep your kidneys from further injury. Please follow up with your nephrologist within 1 week of your discharge from your rehab facility in regards to this matter.  Goal:	Hyperkalemia  Assessment and plan of treatment:	During your hospitalization, your potassium levels were elevated. You were given a medication called Kayexelate which is a medicine that brings Potassium levels down in the blood. Your potassium is likely elevated due to your chronic kidney disease, and so it is recommended that you STOP taking your blood pressure medication called LOSARTAN, as this is a medication that can increase potassium, as well as cause injury to your kidneys. Please stop taking this medication, and follow up with your primary care doctor and cardiologist within 1 week of discharge.

## 2018-11-13 NOTE — PROGRESS NOTE ADULT - ASSESSMENT
LALO  - resolved  CKD stage 3-4  - baseline Cr - 2's  near nephrotic range proteinuria  proteus bacteriuria   hyperkalemia  -mild  hyponatremia  morbid obesity  fall / gait disturbance  HTN  anemia    plan:    off ivf  keep off losartan  no need for kayexalate unless K+ > 5.8  avoid NSAIDs   no abx  f/u ortho  pt / rehab

## 2018-11-13 NOTE — CONSULT NOTE ADULT - SUBJECTIVE AND OBJECTIVE BOX
HPI:  72 y/o morbidly obese female patient with PMHx of HTN, DM, DLD and CKD p/w mechanical fall about 5 or 6 days ago,  Patient fell and twisted her ankle while getting out of bed to use her walker to the bathroom days ago.  Then she slipped again while he was at work and was on the floor all day long until her  found her and called [facility] to help get her back in bed. pt is not unable to get out of bed and ambulate. patient walks with a walker at baseline. The patient reports that she is still in pain and cannot ambulate.     PAST MEDICAL & SURGICAL HISTORY:  Hyperlipidemia  HTN (hypertension)  DM (diabetes mellitus)    MEDICATIONS  (STANDING):  chlorhexidine 4% Liquid 1 Application(s) Topical <User Schedule>  dextrose 5%. 1000 milliLiter(s) (50 mL/Hr) IV Continuous <Continuous>  dextrose 50% Injectable 12.5 Gram(s) IV Push once  dextrose 50% Injectable 25 Gram(s) IV Push once  dextrose 50% Injectable 25 Gram(s) IV Push once  diltiazem    Tablet 90 milliGRAM(s) Oral two times a day  folic acid 1 milliGRAM(s) Oral daily  heparin  Injectable 5000 Unit(s) SubCutaneous every 8 hours  insulin glargine Injectable (LANTUS) 12 Unit(s) SubCutaneous at bedtime  insulin lispro (HumaLOG) corrective regimen sliding scale   SubCutaneous three times a day before meals  insulin lispro Injectable (HumaLOG) 4 Unit(s) SubCutaneous three times a day before meals  metoprolol succinate ER 50 milliGRAM(s) Oral daily  pantoprazole    Tablet 40 milliGRAM(s) Oral before breakfast  polyethylene glycol 3350 17 Gram(s) Oral two times a day  simvastatin 20 milliGRAM(s) Oral at bedtime    MEDICATIONS  (PRN):  dextrose 40% Gel 15 Gram(s) Oral once PRN Blood Glucose LESS THAN 70 milliGRAM(s)/deciliter  docusate sodium 100 milliGRAM(s) Oral daily PRN Constipation  glucagon  Injectable 1 milliGRAM(s) IntraMuscular once PRN Glucose LESS THAN 70 milligrams/deciliter  lactulose Syrup 10 Gram(s) Oral two times a day PRN costipation  senna 1 Tablet(s) Oral daily PRN Constipation  traMADol 50 milliGRAM(s) Oral every 4 hours PRN Moderate Pain (4 - 6)    Allergies  No Known Allergies      Vital Signs Last 24 Hrs  T(C): 36.1 (13 Nov 2018 05:11), Max: 37.3 (12 Nov 2018 12:58)  T(F): 96.9 (13 Nov 2018 05:11), Max: 99.2 (12 Nov 2018 12:58)  HR: 81 (13 Nov 2018 05:11) (66 - 83)  BP: 167/74 (13 Nov 2018 05:11) (165/73 - 195/77)  BP(mean): --  RR: 18 (13 Nov 2018 05:11) (18 - 20)  SpO2: 98% (13 Nov 2018 08:29) (98% - 98%)      Labs;                        8.3    6.65  )-----------( 132      ( 13 Nov 2018 08:47 )             26.3     < from: Xray Ankle 2 Views, Left (11.07.18 @ 18:49) >    EXAM:  XR ANKLE 2 VIEWS LT        PROCEDURE DATE:  11/07/2018     INTERPRETATION:  Indication: Fall.    Technique:  2 radiographs of the left ankle are obtained.    Comparison:  No studies are available for comparison.    Findings:    Noradiographic evidence of acute fracture or ankle dislocation.    Ankle mortise is intact. No ankle joint effusion. Calcaneal heel spur.    Impression:  No evidence of acute osseous abnormality.  EMILIA BAZAN M.D., ATTENDING RADIOLOGIST  This document has been electronically signed. Nov 8 2018  7:17AM    < end of copied text >      Physical Exam:   73 year old obese female lying in bed in NAD.   Left LE: TTP over dorsum of foot and lateral ankle, ROM limited secondary to pain, + sensation, moves toes.     A/P:   Left ankle and foot pain s/p fall with inability to ambulate  obtain left ankle and foot xrays ( ordered)   will f/u

## 2018-11-13 NOTE — DISCHARGE NOTE ADULT - HOSPITAL COURSE
Ms. Feng is a 74 y/o morbidly obese female patient with PMHx of HTN, DM, DLD and CKD p/w mechanical fall PTP. Patient fell and twisted her ankle while getting out of bed to use her walker to the bathroom days ago prior to presentation.  Then she slipped again and was on the floor all day long until her  found her and called [employer] to help get her back in bed. Pt is no unable to get out of bed and ambulate when she typically walks with a walker at baseline.  ROS wasn't obtained as patient is confused and unreliable. Ms. Feng is a 72 y/o morbidly obese female patient with PMHx of HTN, DM, DLD and CKD p/w mechanical fall PTP. Patient fell and twisted her ankle while getting out of bed to use her walker to the bathroom days ago prior to presentation.  Then she slipped again and was on the floor all day long until her  found her and called [employer] to help get her back in bed. Pt is no unable to get out of bed and ambulate when she typically walks with a walker at baseline.  Pt was found to have elevated levels of Creatinine at 3.4. Upon admission, pt was started on IV fluids and her creatinine levels improved. She had an xray of her foot which showed no fractures, and only soft tissue swelling. Pt continued to complain of pain, which was initially controlled with tramadol 50mg prn q6h, however, pt was not improving in her pain level and thus orthopedic surgery was consulted. They ordered a subsequent xray of her left foot and ankle, and again no fractures were found and no orthopedic interventions were recommended.  Physical therapy worked with pt bedside and pt was unable to take any steps, and thus Physiatry recommended that pt would benefit from short term rehab to get pt back to baseline in her ambulation, and pt was agreeable. She has been informed to follow up with her primary care physician and nephrologist within 1 week of discharge from rehab.

## 2018-11-13 NOTE — DISCHARGE NOTE ADULT - ADDITIONAL INSTRUCTIONS
Please continue to work with the rehab facility, and follow up with your primary care doctor within 1 week of your discharge from the rehab facility. Please also continue your home medications as prescribed. Please continue to work with the rehab facility, and follow up with your primary care doctor and nephrologist within 1 week of your discharge from the rehab facility. Please also continue your home medications as prescribed. Please continue to work with the rehab facility, and follow up with your primary care doctor, cardiologist and nephrologist within 1 week of your discharge from the rehab facility. Please also STOP taking the medication losartan, but continue your other home medications as prescribed.

## 2018-11-13 NOTE — DISCHARGE NOTE ADULT - CARE PROVIDER_API CALL
Camilo Butler), Internal Medicine; Nephrology  [id number] [place] [address]  Phone: [phone]  Fax: [phone]

## 2018-11-13 NOTE — PROGRESS NOTE ADULT - ASSESSMENT
74 y/o morbidly obese female patient with PMHx of HTN, DM, DLD and CKD p/w mechanical fall.      #Mechanical fall in the setting of morbid obesity and inability to ambulate   - 11-13-18: Pt continues to c/o pain to left foot/ankle, however no evidence of fracture per ED, Ankle xray was negative. C/w pain cotrol with tramadol. Physiatry recommends STR in SNF, will follow up with . PT service was called, they will be seeing pt this morning. Ortho consult pending. Will f/u with  for disposition planning with STR.    #LALO vs CKD  - 11-13-18: Per Nephro, no need for kayexelate unless K+ >5.8. Will continue to keep pt off losartan (her home dose is 25mg qd). Will avoid NSAIDs.    - Received medical records from pt's Doctors on call service --> baseline Creatnine ~2, thus pt appears to have LALO, likely due to dehydration. Continue to monitor electrolytes and creatnine. Per nephro recommendations, Will continue to hold losartan, UA was negative yet urine culture was positive for proteus, however, likely contaminated due to female condom catheter. Will f/u with urine culture. If bp elevation worsen - can add nifedipine xl 30mg po qd.    #SVT -- resolved  -1-10-18: Pt had one episode of SVT two nights ago --> medical records were faxed and pt was found to have a history of Afib on caridemz 90 q12 --> this was started yesterday, however pt did not receive her AM does and pt had SVT episode last night. Pt has been getting her usual dose today and pt has not had an episode since.     #Hyponatremia   - 11-13-18: Pt continues to have low Na = 133 yesterday --> will f/u AM labs, will continue to monitor BMP    #DM  insulin protocol if FS more than 180  monitor FS    #HTN and DLD  c/w losartan and simvstatin    #Others  Diet, Consistent carb diet  DVT and GI PPX  from home  Full code 72 y/o morbidly obese female patient with PMHx of HTN, DM, DLD and CKD p/w mechanical fall.      #Mechanical fall in the setting of morbid obesity and inability to ambulate   - 11-13-18: Pt continues to c/o pain to left foot/ankle, however no evidence of fracture per ED, Ankle xray was negative. C/w pain cotrol with tramadol. Physiatry recommends STR in SNF, will follow up with . PT service was called, they will be seeing pt this morning. Per ortho, ordered ankle and foot (left) xray --> will f/u. Will f/u with  for disposition planning with STR.    #LALO vs CKD  - 11-13-18: Per Nephro, no need for kayexelate unless K+ >5.8. Will continue to keep pt off losartan (her home dose is 25mg qd). Will avoid NSAIDs.    - Received medical records from pt's Doctors on call service --> baseline Creatnine ~2, thus pt appears to have LALO, likely due to dehydration. Continue to monitor electrolytes and creatnine. Per nephro recommendations, Will continue to hold losartan, UA was negative yet urine culture was positive for proteus, however, likely contaminated due to female condom catheter. Will f/u with urine culture. If bp elevation worsen - can add nifedipine xl 30mg po qd.    #SVT -- resolved  -1-10-18: Pt had one episode of SVT two nights ago --> medical records were faxed and pt was found to have a history of Afib on caridemz 90 q12 --> this was started yesterday, however pt did not receive her AM does and pt had SVT episode last night. Pt has been getting her usual dose today and pt has not had an episode since.     #Hyponatremia   - 11-13-18: Pt continues to have low Na = 133 yesterday --> will f/u AM labs, will continue to monitor BMP    #DM  insulin protocol if FS more than 180  monitor FS    #HTN and DLD  c/w losartan and simvstatin    #Others  Diet, Consistent carb diet  DVT and GI PPX  from home  Full code

## 2018-11-13 NOTE — DISCHARGE NOTE ADULT - PATIENT PORTAL LINK FT
You can access the [facility] Patient Portal, offered by [facility], by registering with the following website: [email or web address]

## 2018-11-14 LAB
ANION GAP SERPL CALC-SCNC: 18 MMOL/L — HIGH (ref 7–14)
BASOPHILS # BLD AUTO: 0.03 K/UL — SIGNIFICANT CHANGE UP (ref 0–0.2)
BASOPHILS NFR BLD AUTO: 0.5 % — SIGNIFICANT CHANGE UP (ref 0–1)
BUN SERPL-MCNC: 67 MG/DL — CRITICAL HIGH (ref 10–20)
CALCIUM SERPL-MCNC: 8.3 MG/DL — LOW (ref 8.5–10.1)
CHLORIDE SERPL-SCNC: 101 MMOL/L — SIGNIFICANT CHANGE UP (ref 98–110)
CO2 SERPL-SCNC: 16 MMOL/L — LOW (ref 17–32)
CREAT SERPL-MCNC: 2.6 MG/DL — HIGH (ref 0.7–1.5)
EOSINOPHIL # BLD AUTO: 0.32 K/UL — SIGNIFICANT CHANGE UP (ref 0–0.7)
EOSINOPHIL NFR BLD AUTO: 5.2 % — SIGNIFICANT CHANGE UP (ref 0–8)
GLUCOSE BLDC GLUCOMTR-MCNC: 125 MG/DL — HIGH (ref 70–99)
GLUCOSE BLDC GLUCOMTR-MCNC: 131 MG/DL — HIGH (ref 70–99)
GLUCOSE BLDC GLUCOMTR-MCNC: 142 MG/DL — HIGH (ref 70–99)
GLUCOSE BLDC GLUCOMTR-MCNC: 149 MG/DL — HIGH (ref 70–99)
GLUCOSE SERPL-MCNC: 88 MG/DL — SIGNIFICANT CHANGE UP (ref 70–99)
HCT VFR BLD CALC: 26.9 % — LOW (ref 37–47)
HGB BLD-MCNC: 8.4 G/DL — LOW (ref 12–16)
IMM GRANULOCYTES NFR BLD AUTO: 1.3 % — HIGH (ref 0.1–0.3)
LYMPHOCYTES # BLD AUTO: 0.81 K/UL — LOW (ref 1.2–3.4)
LYMPHOCYTES # BLD AUTO: 13.2 % — LOW (ref 20.5–51.1)
MCHC RBC-ENTMCNC: 29.9 PG — SIGNIFICANT CHANGE UP (ref 27–31)
MCHC RBC-ENTMCNC: 31.2 G/DL — LOW (ref 32–37)
MCV RBC AUTO: 95.7 FL — SIGNIFICANT CHANGE UP (ref 81–99)
MONOCYTES # BLD AUTO: 0.58 K/UL — SIGNIFICANT CHANGE UP (ref 0.1–0.6)
MONOCYTES NFR BLD AUTO: 9.4 % — HIGH (ref 1.7–9.3)
NEUTROPHILS # BLD AUTO: 4.33 K/UL — SIGNIFICANT CHANGE UP (ref 1.4–6.5)
NEUTROPHILS NFR BLD AUTO: 70.4 % — SIGNIFICANT CHANGE UP (ref 42.2–75.2)
NRBC # BLD: 0 /100 WBCS — SIGNIFICANT CHANGE UP (ref 0–0)
PLATELET # BLD AUTO: 123 K/UL — LOW (ref 130–400)
POTASSIUM SERPL-MCNC: 5.4 MMOL/L — HIGH (ref 3.5–5)
POTASSIUM SERPL-SCNC: 5.4 MMOL/L — HIGH (ref 3.5–5)
RBC # BLD: 2.81 M/UL — LOW (ref 4.2–5.4)
RBC # FLD: 13.5 % — SIGNIFICANT CHANGE UP (ref 11.5–14.5)
SODIUM SERPL-SCNC: 135 MMOL/L — SIGNIFICANT CHANGE UP (ref 135–146)
WBC # BLD: 6.15 K/UL — SIGNIFICANT CHANGE UP (ref 4.8–10.8)
WBC # FLD AUTO: 6.15 K/UL — SIGNIFICANT CHANGE UP (ref 4.8–10.8)

## 2018-11-14 RX ORDER — OXYCODONE AND ACETAMINOPHEN 5; 325 MG/1; MG/1
1 TABLET ORAL EVERY 6 HOURS
Qty: 0 | Refills: 0 | Status: DISCONTINUED | OUTPATIENT
Start: 2018-11-14 | End: 2018-11-14

## 2018-11-14 RX ORDER — OXYCODONE AND ACETAMINOPHEN 5; 325 MG/1; MG/1
1 TABLET ORAL ONCE
Qty: 0 | Refills: 0 | Status: DISCONTINUED | OUTPATIENT
Start: 2018-11-14 | End: 2018-11-15

## 2018-11-14 RX ORDER — TRAMADOL HYDROCHLORIDE 50 MG/1
50 TABLET ORAL EVERY 4 HOURS
Qty: 0 | Refills: 0 | Status: DISCONTINUED | OUTPATIENT
Start: 2018-11-14 | End: 2018-11-16

## 2018-11-14 RX ADMIN — Medication 1 MILLIGRAM(S): at 11:30

## 2018-11-14 RX ADMIN — HEPARIN SODIUM 5000 UNIT(S): 5000 INJECTION INTRAVENOUS; SUBCUTANEOUS at 21:44

## 2018-11-14 RX ADMIN — OXYCODONE AND ACETAMINOPHEN 1 TABLET(S): 5; 325 TABLET ORAL at 12:55

## 2018-11-14 RX ADMIN — HEPARIN SODIUM 5000 UNIT(S): 5000 INJECTION INTRAVENOUS; SUBCUTANEOUS at 05:21

## 2018-11-14 RX ADMIN — Medication 4 UNIT(S): at 17:11

## 2018-11-14 RX ADMIN — OXYCODONE AND ACETAMINOPHEN 1 TABLET(S): 5; 325 TABLET ORAL at 13:25

## 2018-11-14 RX ADMIN — TRAMADOL HYDROCHLORIDE 50 MILLIGRAM(S): 50 TABLET ORAL at 21:16

## 2018-11-14 RX ADMIN — Medication 4 UNIT(S): at 12:44

## 2018-11-14 RX ADMIN — Medication 4 UNIT(S): at 08:06

## 2018-11-14 RX ADMIN — TRAMADOL HYDROCHLORIDE 50 MILLIGRAM(S): 50 TABLET ORAL at 20:46

## 2018-11-14 RX ADMIN — PANTOPRAZOLE SODIUM 40 MILLIGRAM(S): 20 TABLET, DELAYED RELEASE ORAL at 05:22

## 2018-11-14 RX ADMIN — INSULIN GLARGINE 12 UNIT(S): 100 INJECTION, SOLUTION SUBCUTANEOUS at 21:45

## 2018-11-14 RX ADMIN — POLYETHYLENE GLYCOL 3350 17 GRAM(S): 17 POWDER, FOR SOLUTION ORAL at 05:22

## 2018-11-14 RX ADMIN — POLYETHYLENE GLYCOL 3350 17 GRAM(S): 17 POWDER, FOR SOLUTION ORAL at 17:13

## 2018-11-14 RX ADMIN — Medication 50 MILLIGRAM(S): at 05:22

## 2018-11-14 RX ADMIN — CHLORHEXIDINE GLUCONATE 1 APPLICATION(S): 213 SOLUTION TOPICAL at 05:21

## 2018-11-14 RX ADMIN — SIMVASTATIN 20 MILLIGRAM(S): 20 TABLET, FILM COATED ORAL at 21:45

## 2018-11-14 RX ADMIN — HEPARIN SODIUM 5000 UNIT(S): 5000 INJECTION INTRAVENOUS; SUBCUTANEOUS at 13:47

## 2018-11-14 NOTE — PROGRESS NOTE ADULT - ASSESSMENT
72 y/o morbidly obese female patient with PMHx of HTN, DM, DLD and CKD p/w mechanical fall.      #Mechanical fall in the setting of morbid obesity and inability to ambulate   - 11-14-18: Pt continues to c/o pain to left foot/ankle, however no evidence of fracture on subsequent Ankle and foot xray.  Ortho has no recommendations at this time. Will get MRI. C/w pain control with tramadol. Physiatry recommends STR in SNF, will follow up with . PT service was called, they will be seeing pt this morning.     #LALO vs CKD  - 11-13-18: Per Nephro, no need for kayexelate unless K+ >5.8. Will continue to keep pt off losartan (her home dose is 25mg qd). Will avoid NSAIDs.    - Received medical records from pt's Doctors on call service --> baseline Creatnine ~2, thus pt appears to have LALO, likely due to dehydration. Continue to monitor electrolytes and creatnine. Per nephro recommendations, Will continue to hold losartan, UA was negative yet urine culture was positive for proteus, however, likely contaminated due to female condom catheter. Will f/u with urine culture. If bp elevation worsen - can add nifedipine xl 30mg po qd.    #SVT -- resolved  -1-10-18: Pt had one episode of SVT two nights ago --> medical records were faxed and pt was found to have a history of Afib on caridemz 90 q12 --> this was started yesterday, however pt did not receive her AM does and pt had SVT episode last night. Pt has been getting her usual dose today and pt has not had an episode since.     #Hyponatremia   - 11-13-18: Pt continues to have low Na = 133 yesterday --> will f/u AM labs, will continue to monitor BMP    #DM  insulin protocol if FS more than 180  monitor FS    #HTN and DLD  c/w losartan and simvstatin    #Others  Diet, Consistent carb diet  DVT and GI PPX  from home  Full code 72 y/o morbidly obese female patient with PMHx of HTN, DM, DLD and CKD p/w mechanical fall.      #Mechanical fall in the setting of morbid obesity and inability to ambulate   - 11-14-18: Pt continues to c/o pain to left foot/ankle, however no evidence of fracture on subsequent Ankle and foot xray.  Ortho has no recommendations at this time. C/w pain control with tramadol. Physiatry recommends STR in SNF, will follow up with . PT service was called, they will be seeing pt today or tomorrow -- will give pt Percocet 30 minutes prior to PT session.    #LALO vs CKD  - 11-13-18: Per Nephro, no need for kayexelate unless K+ >5.8. Will continue to keep pt off losartan (her home dose is 25mg qd). Will avoid NSAIDs.    - Received medical records from pt's Doctors on call service --> baseline Creatnine ~2, thus pt appears to have LALO, likely due to dehydration. Continue to monitor electrolytes and creatnine. Per nephro recommendations, Will continue to hold losartan, UA was negative yet urine culture was positive for proteus, however, likely contaminated due to female condom catheter. Will f/u with urine culture. If bp elevation worsen - can add nifedipine xl 30mg po qd.    #SVT -- resolved  -1-10-18: Pt had one episode of SVT two nights ago --> medical records were faxed and pt was found to have a history of Afib on caridemz 90 q12 --> this was started yesterday, however pt did not receive her AM does and pt had SVT episode last night. Pt has been getting her usual dose today and pt has not had an episode since.     #Hyponatremia   - 11-13-18: Pt continues to have low Na = 133 yesterday --> will f/u AM labs, will continue to monitor BMP    #DM  insulin protocol if FS more than 180  monitor FS    #HTN and DLD  c/w losartan and simvstatin    #Others  Diet, Consistent carb diet  DVT and GI PPX  from home  Full code

## 2018-11-14 NOTE — DIETITIAN INITIAL EVALUATION ADULT. - ENERGY NEEDS
calorie 1250-1500kcal (25-30kcal/kg IBW) for morbid obesity  protein 50-60g (1-1.2g/kg CBW) for morbid obesity/LALO on CKD  fluid 1ml/kcal or per LIP

## 2018-11-14 NOTE — DIETITIAN INITIAL EVALUATION ADULT. - OTHER INFO
Initial assessment for LOS. P[facility] Mechanical fall in the setting of morbid obesity and inability to ambulate. No evidence of fractures. LALO vs CKD: Nephro following. SVT -- resolved. DM: on insulin.

## 2018-11-14 NOTE — PROGRESS NOTE ADULT - ASSESSMENT
LALO  - resolved  CKD stage 3-4  - baseline Cr - 2's  near nephrotic range proteinuria  proteus bacteriuria   hyperkalemia  -mild  hyponatremia - resolved  morbid obesity  fall / gait disturbance  HTN  anemia    plan:      keep off losartan   kayexalate for K+ > 5.8  low K+ diet  lantus / hjumalog  cardizem / lopressor  pt / rehab

## 2018-11-15 LAB
ANION GAP SERPL CALC-SCNC: 17 MMOL/L — HIGH (ref 7–14)
BASOPHILS # BLD AUTO: 0.03 K/UL — SIGNIFICANT CHANGE UP (ref 0–0.2)
BASOPHILS NFR BLD AUTO: 0.4 % — SIGNIFICANT CHANGE UP (ref 0–1)
BLD GP AB SCN SERPL QL: SIGNIFICANT CHANGE UP
BUN SERPL-MCNC: 70 MG/DL — CRITICAL HIGH (ref 10–20)
CALCIUM SERPL-MCNC: 8.6 MG/DL — SIGNIFICANT CHANGE UP (ref 8.5–10.1)
CHLORIDE SERPL-SCNC: 99 MMOL/L — SIGNIFICANT CHANGE UP (ref 98–110)
CO2 SERPL-SCNC: 16 MMOL/L — LOW (ref 17–32)
CREAT SERPL-MCNC: 3 MG/DL — HIGH (ref 0.7–1.5)
EOSINOPHIL # BLD AUTO: 0.28 K/UL — SIGNIFICANT CHANGE UP (ref 0–0.7)
EOSINOPHIL NFR BLD AUTO: 4.1 % — SIGNIFICANT CHANGE UP (ref 0–8)
GLUCOSE BLDC GLUCOMTR-MCNC: 112 MG/DL — HIGH (ref 70–99)
GLUCOSE BLDC GLUCOMTR-MCNC: 119 MG/DL — HIGH (ref 70–99)
GLUCOSE BLDC GLUCOMTR-MCNC: 123 MG/DL — HIGH (ref 70–99)
GLUCOSE BLDC GLUCOMTR-MCNC: 144 MG/DL — HIGH (ref 70–99)
GLUCOSE BLDC GLUCOMTR-MCNC: 148 MG/DL — HIGH (ref 70–99)
GLUCOSE SERPL-MCNC: 142 MG/DL — HIGH (ref 70–99)
HCT VFR BLD CALC: 26.8 % — LOW (ref 37–47)
HGB BLD-MCNC: 8.5 G/DL — LOW (ref 12–16)
IMM GRANULOCYTES NFR BLD AUTO: 0.6 % — HIGH (ref 0.1–0.3)
LYMPHOCYTES # BLD AUTO: 0.84 K/UL — LOW (ref 1.2–3.4)
LYMPHOCYTES # BLD AUTO: 12.2 % — LOW (ref 20.5–51.1)
MCHC RBC-ENTMCNC: 29.9 PG — SIGNIFICANT CHANGE UP (ref 27–31)
MCHC RBC-ENTMCNC: 31.7 G/DL — LOW (ref 32–37)
MCV RBC AUTO: 94.4 FL — SIGNIFICANT CHANGE UP (ref 81–99)
MONOCYTES # BLD AUTO: 0.5 K/UL — SIGNIFICANT CHANGE UP (ref 0.1–0.6)
MONOCYTES NFR BLD AUTO: 7.2 % — SIGNIFICANT CHANGE UP (ref 1.7–9.3)
NEUTROPHILS # BLD AUTO: 5.21 K/UL — SIGNIFICANT CHANGE UP (ref 1.4–6.5)
NEUTROPHILS NFR BLD AUTO: 75.5 % — HIGH (ref 42.2–75.2)
NRBC # BLD: 0 /100 WBCS — SIGNIFICANT CHANGE UP (ref 0–0)
PLATELET # BLD AUTO: 129 K/UL — LOW (ref 130–400)
POTASSIUM SERPL-MCNC: 5.6 MMOL/L — HIGH (ref 3.5–5)
POTASSIUM SERPL-SCNC: 5.6 MMOL/L — HIGH (ref 3.5–5)
RBC # BLD: 2.84 M/UL — LOW (ref 4.2–5.4)
RBC # FLD: 13.4 % — SIGNIFICANT CHANGE UP (ref 11.5–14.5)
SODIUM SERPL-SCNC: 132 MMOL/L — LOW (ref 135–146)
TYPE + AB SCN PNL BLD: SIGNIFICANT CHANGE UP
WBC # BLD: 6.9 K/UL — SIGNIFICANT CHANGE UP (ref 4.8–10.8)
WBC # FLD AUTO: 6.9 K/UL — SIGNIFICANT CHANGE UP (ref 4.8–10.8)

## 2018-11-15 RX ORDER — SODIUM CHLORIDE 9 MG/ML
1000 INJECTION, SOLUTION INTRAVENOUS
Qty: 0 | Refills: 0 | Status: COMPLETED | OUTPATIENT
Start: 2018-11-15 | End: 2018-11-16

## 2018-11-15 RX ORDER — OXYCODONE AND ACETAMINOPHEN 5; 325 MG/1; MG/1
1 TABLET ORAL EVERY 12 HOURS
Qty: 0 | Refills: 0 | Status: DISCONTINUED | OUTPATIENT
Start: 2018-11-15 | End: 2018-11-15

## 2018-11-15 RX ORDER — LIDOCAINE 4 G/100G
1 CREAM TOPICAL EVERY 24 HOURS
Qty: 0 | Refills: 0 | Status: DISCONTINUED | OUTPATIENT
Start: 2018-11-15 | End: 2018-11-16

## 2018-11-15 RX ORDER — OXYCODONE AND ACETAMINOPHEN 5; 325 MG/1; MG/1
1 TABLET ORAL EVERY 8 HOURS
Qty: 0 | Refills: 0 | Status: DISCONTINUED | OUTPATIENT
Start: 2018-11-15 | End: 2018-11-16

## 2018-11-15 RX ORDER — LOSARTAN POTASSIUM 100 MG/1
1 TABLET, FILM COATED ORAL
Qty: 0 | Refills: 0 | COMMUNITY

## 2018-11-15 RX ORDER — SODIUM POLYSTYRENE SULFONATE 4.1 MEQ/G
30 POWDER, FOR SUSPENSION ORAL ONCE
Qty: 0 | Refills: 0 | Status: COMPLETED | OUTPATIENT
Start: 2018-11-15 | End: 2018-11-15

## 2018-11-15 RX ORDER — SODIUM BICARBONATE 1 MEQ/ML
650 SYRINGE (ML) INTRAVENOUS THREE TIMES A DAY
Qty: 0 | Refills: 0 | Status: DISCONTINUED | OUTPATIENT
Start: 2018-11-15 | End: 2018-11-16

## 2018-11-15 RX ORDER — ASCORBIC ACID 60 MG
500 TABLET,CHEWABLE ORAL DAILY
Qty: 0 | Refills: 0 | Status: DISCONTINUED | OUTPATIENT
Start: 2018-11-15 | End: 2018-11-16

## 2018-11-15 RX ORDER — IBUPROFEN 200 MG
400 TABLET ORAL ONCE
Qty: 0 | Refills: 0 | Status: COMPLETED | OUTPATIENT
Start: 2018-11-15 | End: 2018-11-15

## 2018-11-15 RX ADMIN — SIMVASTATIN 20 MILLIGRAM(S): 20 TABLET, FILM COATED ORAL at 21:03

## 2018-11-15 RX ADMIN — Medication 50 MILLIGRAM(S): at 05:14

## 2018-11-15 RX ADMIN — LIDOCAINE 1 PATCH: 4 CREAM TOPICAL at 12:18

## 2018-11-15 RX ADMIN — HEPARIN SODIUM 5000 UNIT(S): 5000 INJECTION INTRAVENOUS; SUBCUTANEOUS at 14:03

## 2018-11-15 RX ADMIN — INSULIN GLARGINE 12 UNIT(S): 100 INJECTION, SOLUTION SUBCUTANEOUS at 22:18

## 2018-11-15 RX ADMIN — Medication 4 UNIT(S): at 12:19

## 2018-11-15 RX ADMIN — Medication 500 MILLIGRAM(S): at 17:20

## 2018-11-15 RX ADMIN — POLYETHYLENE GLYCOL 3350 17 GRAM(S): 17 POWDER, FOR SOLUTION ORAL at 17:20

## 2018-11-15 RX ADMIN — POLYETHYLENE GLYCOL 3350 17 GRAM(S): 17 POWDER, FOR SOLUTION ORAL at 05:15

## 2018-11-15 RX ADMIN — SODIUM POLYSTYRENE SULFONATE 30 GRAM(S): 4.1 POWDER, FOR SUSPENSION ORAL at 14:02

## 2018-11-15 RX ADMIN — PANTOPRAZOLE SODIUM 40 MILLIGRAM(S): 20 TABLET, DELAYED RELEASE ORAL at 05:13

## 2018-11-15 RX ADMIN — CHLORHEXIDINE GLUCONATE 1 APPLICATION(S): 213 SOLUTION TOPICAL at 05:11

## 2018-11-15 RX ADMIN — HEPARIN SODIUM 5000 UNIT(S): 5000 INJECTION INTRAVENOUS; SUBCUTANEOUS at 21:03

## 2018-11-15 RX ADMIN — Medication 1 MILLIGRAM(S): at 12:23

## 2018-11-15 RX ADMIN — Medication 650 MILLIGRAM(S): at 21:02

## 2018-11-15 RX ADMIN — Medication 4 UNIT(S): at 17:19

## 2018-11-15 RX ADMIN — TRAMADOL HYDROCHLORIDE 50 MILLIGRAM(S): 50 TABLET ORAL at 01:51

## 2018-11-15 RX ADMIN — Medication 4 UNIT(S): at 08:21

## 2018-11-15 RX ADMIN — HEPARIN SODIUM 5000 UNIT(S): 5000 INJECTION INTRAVENOUS; SUBCUTANEOUS at 05:14

## 2018-11-15 RX ADMIN — SODIUM CHLORIDE 75 MILLILITER(S): 9 INJECTION, SOLUTION INTRAVENOUS at 21:58

## 2018-11-15 RX ADMIN — Medication 650 MILLIGRAM(S): at 14:02

## 2018-11-15 RX ADMIN — TRAMADOL HYDROCHLORIDE 50 MILLIGRAM(S): 50 TABLET ORAL at 02:25

## 2018-11-15 RX ADMIN — Medication 400 MILLIGRAM(S): at 12:29

## 2018-11-15 NOTE — PROGRESS NOTE ADULT - ASSESSMENT
LALO  CKD stage 3-4  - baseline Cr - 2's  near nephrotic range proteinuria  proteus bacteriuria   hyperkalemia  hyponatremia   metabolic acidosis  morbid obesity  fall / gait disturbance  HTN  anemia    plan:    ok with oral sodium bicarbonate  1/2ns with nabicarb 75meq/l @ 75 cc/hr x 12 hours  kayexalate given   low K+ diet  lantus / hjumalog  cardizem / lopressor  pt / rehab  dc planning

## 2018-11-15 NOTE — PROGRESS NOTE ADULT - ASSESSMENT
72 y/o morbidly obese female patient with PMHx of HTN, DM, DLD and CKD p/w mechanical fall.      #Mechanical fall in the setting of morbid obesity and inability to ambulate   - 11-15-18: Pt continues to c/o pain to left foot/ankle, however no evidence of fracture on subsequent Ankle and foot xray.  Ortho has no recommendations at this time. Physiatry recommends STR in SNF, will follow up with . PT worked with pt yesterday, was only able to get pt to sit up in bed, no steps taken. Will continue to work with PT and will f/u with  in regards to STR placement. Will escalate pain management to Percocet q8h for severe pain, as well as 30 minutes prior to PT session, and will c/w tramadol for moderate pain.     #LALO vs CKD -- resolved (creatinine on admission = 3.4, pt is now at baseline)  - 11-13-18: Per Nephro, no need for kayexelate unless K+ >5.8. Will continue to keep pt off losartan (her home dose is 25mg qd). Will avoid NSAIDs.    - Received medical records from pt's Doctors on call service --> baseline Creatnine ~2, thus pt appears to have LALO, likely due to dehydration. Continue to monitor electrolytes and creatnine. Per nephro recommendations, Will continue to hold losartan, UA was negative yet urine culture was positive for proteus, however, likely contaminated due to butterfly catheter. Will f/u with urine culture. If bp elevation worsen - can add nifedipine xl 30mg po qd.    #SVT -- resolved  -1-10-18: Pt had one episode of SVT two nights ago --> medical records were faxed and pt was found to have a history of Afib on caridemz 90 q12 --> this was started yesterday, however pt did not receive her AM does and pt had SVT episode last night. Pt has been getting her usual dose today and pt has not had an episode since.     #Hyponatremia   - 11-13-18: Pt continues to have low Na = 133 yesterday --> will f/u AM labs, will continue to monitor BMP    #DM  insulin protocol if FS more than 180  monitor FS    #HTN and DLD  c/w losartan and simvstatin    #Others  Diet, Consistent carb diet  DVT and GI PPX  from home  Full code 74 y/o morbidly obese female patient with PMHx of HTN, DM, DLD and CKD p/w mechanical fall.      #Mechanical fall in the setting of morbid obesity and inability to ambulate   - 11-15-18: Pt continues to c/o pain to left foot/ankle, however no evidence of fracture on subsequent Ankle and foot xray.  Ortho has no recommendations at this time. Physiatry recommends STR in SNF, will follow up with . PT worked with pt yesterday, was only able to get pt to sit up in bed, no steps taken. Will continue to work with PT and will f/u with  in regards to STR placement. Will escalate pain management to Percocet q8h for severe pain, as well as 30 minutes prior to PT session, and will c/w tramadol for moderate pain.  Will need outpatient open MRI to assess for soft tissue dysfunction    #LALO vs CKD -- resolved (creatinine on admission = 3.4, pt is now at baseline)  - 11-13-18: Per Nephro, no need for kayexelate unless K+ >5.8. Will continue to keep pt off losartan (her home dose is 25mg qd). Will avoid NSAIDs.    - Received medical records from pt's Doctors on call service --> baseline Creatnine ~2, thus pt appears to have LALO, likely due to dehydration. Continue to monitor electrolytes and creatnine. Per nephro recommendations, Will continue to hold losartan, UA was negative yet urine culture was positive for proteus, however, likely contaminated due to butterfly catheter. Will f/u with urine culture. If bp elevation worsen - can add nifedipine xl 30mg po qd.    #SVT -- resolved  -1-10-18: Pt had one episode of SVT two nights ago --> medical records were faxed and pt was found to have a history of Afib on caridemz 90 q12 --> this was started yesterday, however pt did not receive her AM does and pt had SVT episode last night. Pt has been getting her usual dose today and pt has not had an episode since.     #Hyponatremia   - 11-13-18: Pt continues to have low Na = 133 yesterday --> will f/u AM labs, will continue to monitor BMP    #DM  insulin protocol if FS more than 180  monitor FS    #HTN and DLD  c/w losartan and simvstatin    #Others  Diet, Consistent carb diet  DVT and GI PPX  from home  Full code

## 2018-11-16 VITALS
RESPIRATION RATE: 18 BRPM | TEMPERATURE: 96 F | HEART RATE: 66 BPM | DIASTOLIC BLOOD PRESSURE: 70 MMHG | SYSTOLIC BLOOD PRESSURE: 174 MMHG

## 2018-11-16 LAB
GLUCOSE BLDC GLUCOMTR-MCNC: 111 MG/DL — HIGH (ref 70–99)
GLUCOSE BLDC GLUCOMTR-MCNC: 145 MG/DL — HIGH (ref 70–99)
GLUCOSE BLDC GLUCOMTR-MCNC: 149 MG/DL — HIGH (ref 70–99)

## 2018-11-16 RX ORDER — SODIUM BICARBONATE 1 MEQ/ML
1 SYRINGE (ML) INTRAVENOUS
Qty: 0 | Refills: 0 | COMMUNITY
Start: 2018-11-16

## 2018-11-16 RX ORDER — HYDRALAZINE HCL 50 MG
1 TABLET ORAL
Qty: 0 | Refills: 0 | COMMUNITY
Start: 2018-11-16

## 2018-11-16 RX ORDER — HYDRALAZINE HCL 50 MG
10 TABLET ORAL EVERY 8 HOURS
Qty: 0 | Refills: 0 | Status: DISCONTINUED | OUTPATIENT
Start: 2018-11-16 | End: 2018-11-16

## 2018-11-16 RX ADMIN — LIDOCAINE 1 PATCH: 4 CREAM TOPICAL at 00:55

## 2018-11-16 RX ADMIN — SENNA PLUS 1 TABLET(S): 8.6 TABLET ORAL at 13:50

## 2018-11-16 RX ADMIN — SODIUM CHLORIDE 75 MILLILITER(S): 9 INJECTION, SOLUTION INTRAVENOUS at 06:48

## 2018-11-16 RX ADMIN — HEPARIN SODIUM 5000 UNIT(S): 5000 INJECTION INTRAVENOUS; SUBCUTANEOUS at 13:20

## 2018-11-16 RX ADMIN — Medication 50 MILLIGRAM(S): at 05:25

## 2018-11-16 RX ADMIN — Medication 100 MILLIGRAM(S): at 13:50

## 2018-11-16 RX ADMIN — POLYETHYLENE GLYCOL 3350 17 GRAM(S): 17 POWDER, FOR SOLUTION ORAL at 17:01

## 2018-11-16 RX ADMIN — Medication 650 MILLIGRAM(S): at 05:25

## 2018-11-16 RX ADMIN — CHLORHEXIDINE GLUCONATE 1 APPLICATION(S): 213 SOLUTION TOPICAL at 05:25

## 2018-11-16 RX ADMIN — Medication 4 UNIT(S): at 12:08

## 2018-11-16 RX ADMIN — Medication 1 MILLIGRAM(S): at 11:09

## 2018-11-16 RX ADMIN — LIDOCAINE 1 PATCH: 4 CREAM TOPICAL at 11:09

## 2018-11-16 RX ADMIN — Medication 4 UNIT(S): at 17:01

## 2018-11-16 RX ADMIN — Medication 650 MILLIGRAM(S): at 13:20

## 2018-11-16 RX ADMIN — Medication 4 UNIT(S): at 08:02

## 2018-11-16 RX ADMIN — PANTOPRAZOLE SODIUM 40 MILLIGRAM(S): 20 TABLET, DELAYED RELEASE ORAL at 05:25

## 2018-11-16 RX ADMIN — TRAMADOL HYDROCHLORIDE 50 MILLIGRAM(S): 50 TABLET ORAL at 11:13

## 2018-11-16 RX ADMIN — Medication 500 MILLIGRAM(S): at 11:09

## 2018-11-16 RX ADMIN — OXYCODONE AND ACETAMINOPHEN 1 TABLET(S): 5; 325 TABLET ORAL at 13:50

## 2018-11-16 RX ADMIN — POLYETHYLENE GLYCOL 3350 17 GRAM(S): 17 POWDER, FOR SOLUTION ORAL at 05:26

## 2018-11-16 RX ADMIN — HEPARIN SODIUM 5000 UNIT(S): 5000 INJECTION INTRAVENOUS; SUBCUTANEOUS at 05:26

## 2018-11-16 NOTE — PROGRESS NOTE ADULT - ASSESSMENT
74 y/o morbidly obese female patient with PMHx of HTN, DM, DLD and CKD p/w mechanical fall.      #Mechanical fall in the setting of morbid obesity and inability to ambulate   - 11-16-18: Pt continues to c/o pain to left foot/ankle, however no evidence of fracture on subsequent Ankle and foot xray.  Ortho has no recommendations at this time. Physiatry recommends STR in SNF, awaiting placement. Will continue to work with PT while in-house. Pain management with Percocet q8h for severe pain, as well as 30 minutes prior to PT session, and will c/w tramadol for moderate pain.  Will need outpatient open MRI to assess for soft tissue dysfunction    #LALO vs CKD -- resolved (creatinine on admission = 3.4, pt is now at baseline)  - 11-13-18: Per Nephro, no need for kayexelate unless K+ >5.8. Will continue to keep pt off losartan (her home dose is 25mg qd). Will avoid NSAIDs.    - Received medical records from pt's Doctors on call service --> baseline Creatnine ~2, thus pt appears to have LALO, likely due to dehydration. Continue to monitor electrolytes and creatnine. Per nephro recommendations, Will continue to hold losartan, UA was negative yet urine culture was positive for proteus, however, likely contaminated due to butterfly catheter. Will f/u with urine culture. If bp elevation worsen - can add nifedipine xl 30mg po qd.    #SVT -- resolved  -1-10-18: Pt had one episode of SVT two nights ago --> medical records were faxed and pt was found to have a history of Afib on caridemz 90 q12 --> this was started yesterday, however pt did not receive her AM does and pt had SVT episode last night. Pt has been getting her usual dose today and pt has not had an episode since.     #Hyponatremia   - 11-13-18: Pt continues to have low Na = 133 yesterday --> will f/u AM labs, will continue to monitor BMP    #DM  insulin protocol if FS more than 180  monitor FS    #HTN and DLD  c/w losartan and simvstatin    #Others  Diet, Consistent carb diet  DVT and GI PPX  from home  Full code

## 2018-11-16 NOTE — PROGRESS NOTE ADULT - PROVIDER SPECIALTY LIST ADULT
Internal Medicine
Nephrology
Internal Medicine
Nephrology
Internal Medicine

## 2018-11-16 NOTE — PROGRESS NOTE ADULT - SUBJECTIVE AND OBJECTIVE BOX
NEPHROLOGY FOLLOW UP NOTE    pt seen and examined  d/w team  no new events  given alkali ivf overnight  for dc to snf today    PAST MEDICAL & SURGICAL HISTORY:  Hyperlipidemia  HTN (hypertension)  DM (diabetes mellitus)  morbid obesity    Allergies:  No Known Allergies    Home Medications Reviewed    SOCIAL HISTORY:  Denies ETOH,Smoking,   FAMILY HISTORY:  No pertinent family history in first degree relatives        REVIEW OF SYSTEMS:  All other review of systems is negative unless indicated above.      PHYSICAL EXAM:  NAD  morbidly obese  moist mm  distant hs  decreased bs b/l  soft  trace edema    Hospital Medications:   MEDICATIONS  (STANDING):  ascorbic acid 500 milliGRAM(s) Oral daily  chlorhexidine 4% Liquid 1 Application(s) Topical <User Schedule>  dextrose 5%. 1000 milliLiter(s) (50 mL/Hr) IV Continuous <Continuous>  dextrose 50% Injectable 12.5 Gram(s) IV Push once  dextrose 50% Injectable 25 Gram(s) IV Push once  dextrose 50% Injectable 25 Gram(s) IV Push once  diltiazem    Tablet 90 milliGRAM(s) Oral two times a day  folic acid 1 milliGRAM(s) Oral daily  heparin  Injectable 5000 Unit(s) SubCutaneous every 8 hours  hydrALAZINE 10 milliGRAM(s) Oral every 8 hours  insulin glargine Injectable (LANTUS) 12 Unit(s) SubCutaneous at bedtime  insulin lispro (HumaLOG) corrective regimen sliding scale   SubCutaneous three times a day before meals  insulin lispro Injectable (HumaLOG) 4 Unit(s) SubCutaneous three times a day before meals  lidocaine   Patch 1 Patch Transdermal every 24 hours  metoprolol succinate ER 50 milliGRAM(s) Oral daily  pantoprazole    Tablet 40 milliGRAM(s) Oral before breakfast  polyethylene glycol 3350 17 Gram(s) Oral two times a day  simvastatin 20 milliGRAM(s) Oral at bedtime  sodium bicarbonate 650 milliGRAM(s) Oral three times a day        VITALS:  T(F): 96.5 (18 @ 13:26), Max: 97.5 (11-15-18 @ 20:30)  HR: 66 (18 @ 13:26)  BP: 174/70 (18 @ 13:26)  RR: 18 (18 @ 13:26)  SpO2: --  Wt(kg): --     @ 07:01  -  11-15 @ 07:00  --------------------------------------------------------  IN: 0 mL / OUT: 2000 mL / NET: -2000 mL    11-15 @ 07:  -   @ 07:00  --------------------------------------------------------  IN: 0 mL / OUT: 2000 mL / NET: - mL     @ 07:01  -   @ 16:50  --------------------------------------------------------  IN: 0 mL / OUT: 800 mL / NET: -800 mL          LABS:  11-15    132<L>  |  99  |  70<HH>  ----------------------------<  142<H>  5.6<H>   |  16<L>  |  3.0<H>    Ca    8.6      15 Nov 2018 09:02                            8.5    6.90  )-----------( 129      ( 15 Nov 2018 09:02 )             26.8       Urine Studies:  Urinalysis Basic - ( 2018 18:00 )    Color: Yellow / Appearance: Turbid / S.015 / pH:   Gluc:  / Ketone: Negative  / Bili: Negative / Urobili: 0.2 mg/dL   Blood:  / Protein: >=300 mg/dL / Nitrite: Negative   Leuk Esterase: Trace / RBC:  / WBC 1-2 /HPF   Sq Epi:  / Non Sq Epi: Moderate /HPF / Bacteria: Many /HPF          RADIOLOGY & ADDITIONAL STUDIES:
PROGRESS NOTE  Chief Complaint:  Patient is a 73y old  Female who presents with a chief complaint of mechanical fall (09 Nov 2018 11:13)      HPI:  72 y/o morbidly obese female patient with PMHx of HTN, DM, DLD and CKD p/w mechanical fall PTP. history was obtained from the ED chart as the patient is confused and unreliable and the  is not answering. Patient fell and twisted her ankle while getting out of bed to use her walker to the bathroom days ago.  Then she slipped again while he was at work and was on the floor all day long until her  found her and called [facility] to help get her back in bed. pt is no unable to get out of bed and ambulate. patient walks with a walker at baseline.     ROS wasn't obtained as patient is confused and unreliable.    Vital Signs Last 24 Hrs  T(C): 36.4 (10 Nov 2018 04:48), Max: 36.4 (10 Nov 2018 04:48)  T(F): 97.5 (10 Nov 2018 04:48), Max: 97.5 (10 Nov 2018 04:48)  HR: 71 (10 Nov 2018 04:48) (67 - 71)  BP: 195/89 (10 Nov 2018 04:48) (126/59 - 195/89)  RR: 18 (10 Nov 2018 04:48) (18 - 18)  SpO2: 97% (09 Nov 2018 20:44) (97% - 97%)    No ankle fracture per ED but admitted for PT and rehab (08 Nov 2018 01:59)      ALLERGIES:  No Known Allergies      HOSPITAL MEDICATIONS:  MEDICATIONS  (STANDING):  dextrose 5%. 1000 milliLiter(s) (50 mL/Hr) IV Continuous <Continuous>  dextrose 50% Injectable 12.5 Gram(s) IV Push once  dextrose 50% Injectable 25 Gram(s) IV Push once  dextrose 50% Injectable 25 Gram(s) IV Push once  folic acid 1 milliGRAM(s) Oral daily  heparin  Injectable 5000 Unit(s) SubCutaneous every 8 hours  metoprolol succinate ER 50 milliGRAM(s) Oral daily  pantoprazole    Tablet 40 milliGRAM(s) Oral before breakfast  simvastatin 20 milliGRAM(s) Oral at bedtime  sodium chloride 0.9%. 1000 milliLiter(s) (75 mL/Hr) IV Continuous <Continuous>    MEDICATIONS  (PRN):  dextrose 40% Gel 15 Gram(s) Oral once PRN Blood Glucose LESS THAN 70 milliGRAM(s)/deciliter  docusate sodium 100 milliGRAM(s) Oral daily PRN Constipation  glucagon  Injectable 1 milliGRAM(s) IntraMuscular once PRN Glucose LESS THAN 70 milligrams/deciliter  lactulose Syrup 10 Gram(s) Oral two times a day PRN costipation  senna 1 Tablet(s) Oral daily PRN Constipation  traMADol 50 milliGRAM(s) Oral every 4 hours PRN Moderate Pain (4 - 6)      PMHX/PSHX:  Hyperlipidemia  HTN (hypertension)  DM (diabetes mellitus)      FAMILY HISTORY:  No pertinent family history in first degree relatives            General:  No wt loss, fevers, chills, night sweats, fatigue,   Eyes:  Good vision, no reported pain  ENT:  No sore throat, pain, runny nose, dysphagia  CV:  No pain, palpitations, hypo/hypertension  Resp:  No dyspnea, cough, tachypnea, wheezing  GI:  No pain, No nausea, No vomiting, No diarrhea, No constipation, No weight loss, No fever, No pruritis, No rectal bleeding, No tarry stools, No dysphagia,  :  No pain, bleeding, incontinence, nocturia  Muscle:  No pain, weakness  Neuro:  No weakness, tingling, memory problems  Psych:  No fatigue, insomnia, mood problems, depression  Endocrine:  No polyuria, polydipsia, cold/heat intolerance  Heme:  No petechiae, ecchymosis, easy bruisability  Skin:  Skin decoloration at the left ankle area tattoos, scars, edema  Ext - Left ankle edema and tender, decreased range of motion.       PHYSICAL EXAM:   Vital Signs Last 24 Hrs  T(C): 36.4 (10 Nov 2018 04:48), Max: 36.4 (10 Nov 2018 04:48)  T(F): 97.5 (10 Nov 2018 04:48), Max: 97.5 (10 Nov 2018 04:48)  HR: 71 (10 Nov 2018 04:48) (67 - 71)  BP: 195/89 (10 Nov 2018 04:48) (126/59 - 195/89)  RR: 18 (10 Nov 2018 04:48) (18 - 18)  SpO2: 97% (09 Nov 2018 20:44) (97% - 97%)      GENERAL:  Appears stated age, well-groomed, well-nourished, no distress  HEENT:  NC/AT,  conjunctivae clear and pink, no thyromegaly, nodules, adenopathy, no JVD, sclera -anicteric  CHEST:  Full & symmetric excursion, no increased effort, breath sounds clear  HEART:  Regular rhythm, S1, S2, no murmur/rub/S3/S4, no abdominal bruit, no edema  ABDOMEN:  Soft, non-tender, non-distended, normoactive bowel sounds,  no masses ,no hepato-splenomegaly, no signs of chronic liver disease  EXTEREMITIES:  no cyanosis,clubbing or edema  SKIN:  No rash/erythema/ecchymoses/petechiae/wounds/abscess/warm/dry  NEURO:  Alert, oriented, no asterixis, no tremor, no encephalopathy    LABS:                        9.1    9.72  )-----------( 89       ( 09 Nov 2018 13:08 )             28.2     11-08    135  |  98  |  67<HH>  ----------------------------<  125<H>  6.0<HH>   |  19  |  3.2<H>    Ca    8.1<L>      08 Nov 2018 09:42  Phos  5.3     11-08  Mg     3.2     11-08    TPro  7.2  /  Alb  3.4<L>  /  TBili  0.3  /  DBili  x   /  AST  36  /  ALT  15  /  AlkPhos  68  11-07    LIVER FUNCTIONS - ( 07 Nov 2018 19:30 )  Alb: 3.4 g/dL / Pro: 7.2 g/dL / ALK PHOS: 68 U/L / ALT: 15 U/L / AST: 36 U/L / GGT: x           PT/INR - ( 07 Nov 2018 19:30 )   PT: 10.80 sec;   INR: 0.94 ratio
NEPHROLOGY FOLLOW UP NOTE    no overnight events  cr stable  K+ better    PAST MEDICAL & SURGICAL HISTORY:  Hyperlipidemia  HTN (hypertension)  DM (diabetes mellitus)  morbid obesity    Allergies:  No Known Allergies    Home Medications Reviewed    SOCIAL HISTORY:  Denies ETOH,Smoking,   FAMILY HISTORY:  No pertinent family history in first degree relatives        REVIEW OF SYSTEMS:  All other review of systems is negative unless indicated above.      PHYSICAL EXAM:  NAD  morbidly obese  moist mm  distant hs  decreased bs b/l  soft  trace edema    Hospital Medications:   MEDICATIONS  (STANDING):  chlorhexidine 4% Liquid 1 Application(s) Topical <User Schedule>  dextrose 5%. 1000 milliLiter(s) (50 mL/Hr) IV Continuous <Continuous>  dextrose 50% Injectable 12.5 Gram(s) IV Push once  dextrose 50% Injectable 25 Gram(s) IV Push once  dextrose 50% Injectable 25 Gram(s) IV Push once  diltiazem    Tablet 90 milliGRAM(s) Oral two times a day  folic acid 1 milliGRAM(s) Oral daily  heparin  Injectable 5000 Unit(s) SubCutaneous every 8 hours  insulin glargine Injectable (LANTUS) 12 Unit(s) SubCutaneous at bedtime  insulin lispro (HumaLOG) corrective regimen sliding scale   SubCutaneous three times a day before meals  insulin lispro Injectable (HumaLOG) 4 Unit(s) SubCutaneous three times a day before meals  metoprolol succinate ER 50 milliGRAM(s) Oral daily  pantoprazole    Tablet 40 milliGRAM(s) Oral before breakfast  polyethylene glycol 3350 17 Gram(s) Oral two times a day  simvastatin 20 milliGRAM(s) Oral at bedtime        VITALS:  T(F): 96.6 (18 @ 14:26), Max: 97.3 (18 @ 21:00)  HR: 72 (18 @ 14:26)  BP: 145/66 (18 @ 14:26)  RR: 18 (18 @ 14:26)  SpO2: 98% (18 @ 08:29)  Wt(kg): --     @ 07:01  -   @ 07:00  --------------------------------------------------------  IN: 675 mL / OUT: 200 mL / NET: 475 mL     @ 07:01  -   @ 07:00  --------------------------------------------------------  IN: 0 mL / OUT: 2250 mL / NET: -2250 mL          LABS:      133<L>  |  98  |  63<HH>  ----------------------------<  110<H>  5.1<H>   |  17  |  2.6<H>    Ca    7.9<L>      2018 08:47                            8.3    6.65  )-----------( 132      ( 2018 08:47 )             26.3       Urine Studies:  Urinalysis Basic - ( 2018 18:00 )    Color: Yellow / Appearance: Turbid / S.015 / pH:   Gluc:  / Ketone: Negative  / Bili: Negative / Urobili: 0.2 mg/dL   Blood:  / Protein: >=300 mg/dL / Nitrite: Negative   Leuk Esterase: Trace / RBC:  / WBC 1-2 /HPF   Sq Epi:  / Non Sq Epi: Moderate /HPF / Bacteria: Many /HPF      Potassium, Random Urine: 33 mmol/L ( @ 02:53)  Sodium, Random Urine: 64.0 mmoL/L ( @ 02:53)  Chloride, Random Urine: 44 ( @ 02:53)  Protein/Creatinine Ratio Calculation: 2.8 Ratio ( @ 02:53)  Creatinine, Random Urine: 79 mg/dL ( @ 02:53)      RADIOLOGY & ADDITIONAL STUDIES:
NEPHROLOGY FOLLOW UP NOTE    pt seen and examined  d/w   cr stable  still not ambulatory    PAST MEDICAL & SURGICAL HISTORY:  Hyperlipidemia  HTN (hypertension)  DM (diabetes mellitus)  morbid obesity    Allergies:  No Known Allergies    Home Medications Reviewed    SOCIAL HISTORY:  Denies ETOH,Smoking,   FAMILY HISTORY:  No pertinent family history in first degree relatives        REVIEW OF SYSTEMS:  All other review of systems is negative unless indicated above.      PHYSICAL EXAM:  NAD  morbidly obese  moist mm  distant hs  decreased bs b/l  soft  trace edema    Hospital Medications:   MEDICATIONS  (STANDING):  chlorhexidine 4% Liquid 1 Application(s) Topical <User Schedule>  dextrose 5%. 1000 milliLiter(s) (50 mL/Hr) IV Continuous <Continuous>  dextrose 50% Injectable 12.5 Gram(s) IV Push once  dextrose 50% Injectable 25 Gram(s) IV Push once  dextrose 50% Injectable 25 Gram(s) IV Push once  diltiazem    Tablet 90 milliGRAM(s) Oral two times a day  folic acid 1 milliGRAM(s) Oral daily  heparin  Injectable 5000 Unit(s) SubCutaneous every 8 hours  insulin glargine Injectable (LANTUS) 12 Unit(s) SubCutaneous at bedtime  insulin lispro (HumaLOG) corrective regimen sliding scale   SubCutaneous three times a day before meals  insulin lispro Injectable (HumaLOG) 4 Unit(s) SubCutaneous three times a day before meals  metoprolol succinate ER 50 milliGRAM(s) Oral daily  pantoprazole    Tablet 40 milliGRAM(s) Oral before breakfast  polyethylene glycol 3350 17 Gram(s) Oral two times a day  simvastatin 20 milliGRAM(s) Oral at bedtime        VITALS:  T(F): 97 (18 @ 12:35), Max: 98.8 (18 @ 05:25)  HR: 89 (18 @ 12:35)  BP: 136/63 (18 @ 12:35)  RR: 18 (18 @ 12:35)  SpO2: 95% (18 @ 08:04)  Wt(kg): --     @ 07:01  -   @ 07:00  --------------------------------------------------------  IN: 0 mL / OUT: 2250 mL / NET: -2250 mL     @ 07:01  -   @ 07:00  --------------------------------------------------------  IN: 0 mL / OUT: 1600 mL / NET: -1600 mL          LABS:      135  |  101  |  67<HH>  ----------------------------<  88  5.4<H>   |  16<L>  |  2.6<H>    Ca    8.3<L>      2018 07:15                            8.4    6.15  )-----------( 123      ( 2018 07:15 )             26.9       Urine Studies:  Urinalysis Basic - ( 2018 18:00 )    Color: Yellow / Appearance: Turbid / S.015 / pH:   Gluc:  / Ketone: Negative  / Bili: Negative / Urobili: 0.2 mg/dL   Blood:  / Protein: >=300 mg/dL / Nitrite: Negative   Leuk Esterase: Trace / RBC:  / WBC 1-2 /HPF   Sq Epi:  / Non Sq Epi: Moderate /HPF / Bacteria: Many /HPF      Potassium, Random Urine: 33 mmol/L ( @ 02:53)  Sodium, Random Urine: 64.0 mmoL/L ( @ 02:53)  Chloride, Random Urine: 44 ( @ 02:53)  Protein/Creatinine Ratio Calculation: 2.8 Ratio ( @ 02:53)  Creatinine, Random Urine: 79 mg/dL ( @ 02:53)      RADIOLOGY & ADDITIONAL STUDIES:        RADIOLOGY & ADDITIONAL STUDIES:
NEPHROLOGY FOLLOW UP NOTE    pt seen and examined  d/w team  mild bump in cr  no new complaints    PAST MEDICAL & SURGICAL HISTORY:  Hyperlipidemia  HTN (hypertension)  DM (diabetes mellitus)  morbid obesity    Allergies:  No Known Allergies    Home Medications Reviewed    SOCIAL HISTORY:  Denies ETOH,Smoking,   FAMILY HISTORY:  No pertinent family history in first degree relatives        REVIEW OF SYSTEMS:  All other review of systems is negative unless indicated above.      PHYSICAL EXAM:  NAD  morbidly obese  moist mm  distant hs  decreased bs b/l  soft  trace edema    Hospital Medications:   MEDICATIONS  (STANDING):  ascorbic acid 500 milliGRAM(s) Oral daily  chlorhexidine 4% Liquid 1 Application(s) Topical <User Schedule>  dextrose 5%. 1000 milliLiter(s) (50 mL/Hr) IV Continuous <Continuous>  dextrose 50% Injectable 12.5 Gram(s) IV Push once  dextrose 50% Injectable 25 Gram(s) IV Push once  dextrose 50% Injectable 25 Gram(s) IV Push once  diltiazem    Tablet 90 milliGRAM(s) Oral two times a day  folic acid 1 milliGRAM(s) Oral daily  heparin  Injectable 5000 Unit(s) SubCutaneous every 8 hours  insulin glargine Injectable (LANTUS) 12 Unit(s) SubCutaneous at bedtime  insulin lispro (HumaLOG) corrective regimen sliding scale   SubCutaneous three times a day before meals  insulin lispro Injectable (HumaLOG) 4 Unit(s) SubCutaneous three times a day before meals  lidocaine   Patch 1 Patch Transdermal every 24 hours  metoprolol succinate ER 50 milliGRAM(s) Oral daily  pantoprazole    Tablet 40 milliGRAM(s) Oral before breakfast  polyethylene glycol 3350 17 Gram(s) Oral two times a day  simvastatin 20 milliGRAM(s) Oral at bedtime  sodium bicarbonate 650 milliGRAM(s) Oral three times a day        VITALS:  T(F): 98.4 (11-15-18 @ 13:09), Max: 98.4 (11-15-18 @ 05:03)  HR: 61 (11-15-18 @ 13:09)  BP: 106/66 (11-15-18 @ 13:09)  RR: 18 (11-15-18 @ 13:09)  SpO2: --  Wt(kg): --     @ 07:01  -   @ 07:00  --------------------------------------------------------  IN: 0 mL / OUT: 1600 mL / NET: -1600 mL     @ 07:01  -  11-15 @ 07:00  --------------------------------------------------------  IN: 0 mL / OUT: 2000 mL / NET: -2000 mL          LABS:  11-15    132<L>  |  99  |  70<HH>  ----------------------------<  142<H>  5.6<H>   |  16<L>  |  3.0<H>    Ca    8.6      15 Nov 2018 09:02                            8.5    6.90  )-----------( 129      ( 15 Nov 2018 09:02 )             26.8       Urine Studies:  Urinalysis Basic - ( 2018 18:00 )    Color: Yellow / Appearance: Turbid / S.015 / pH:   Gluc:  / Ketone: Negative  / Bili: Negative / Urobili: 0.2 mg/dL   Blood:  / Protein: >=300 mg/dL / Nitrite: Negative   Leuk Esterase: Trace / RBC:  / WBC 1-2 /HPF   Sq Epi:  / Non Sq Epi: Moderate /HPF / Bacteria: Many /HPF      Potassium, Random Urine: 33 mmol/L ( @ 02:53)  Sodium, Random Urine: 64.0 mmoL/L ( @ 02:53)  Chloride, Random Urine: 44 ( @ 02:53)  Protein/Creatinine Ratio Calculation: 2.8 Ratio ( 02:53)  Creatinine, Random Urine: 79 mg/dL ( @ 02:53)      RADIOLOGY & ADDITIONAL STUDIES:          RADIOLOGY & ADDITIONAL STUDIES:
NEPHROLOGY FOLLOW UP NOTE    pt seen and examined  d/w team /   pt c/o leg discomfort and weakness  cr improved    PAST MEDICAL & SURGICAL HISTORY:  Hyperlipidemia  HTN (hypertension)  DM (diabetes mellitus)  morbid obesity    Allergies:  No Known Allergies    Home Medications Reviewed    SOCIAL HISTORY:  Denies ETOH,Smoking,   FAMILY HISTORY:  No pertinent family history in first degree relatives        REVIEW OF SYSTEMS:  All other review of systems is negative unless indicated above.    advance care planning and advance care directives reviewed and d/w pt and     PHYSICAL EXAM:  NAD  morbidly obese  moist mm  distant hs  decreased bs b/l  soft  trace edema    Hospital Medications:   MEDICATIONS  (STANDING):  chlorhexidine 4% Liquid 1 Application(s) Topical <User Schedule>  dextrose 5%. 1000 milliLiter(s) (50 mL/Hr) IV Continuous <Continuous>  dextrose 50% Injectable 12.5 Gram(s) IV Push once  dextrose 50% Injectable 25 Gram(s) IV Push once  dextrose 50% Injectable 25 Gram(s) IV Push once  diltiazem    Tablet 90 milliGRAM(s) Oral two times a day  folic acid 1 milliGRAM(s) Oral daily  heparin  Injectable 5000 Unit(s) SubCutaneous every 8 hours  insulin glargine Injectable (LANTUS) 12 Unit(s) SubCutaneous at bedtime  insulin lispro (HumaLOG) corrective regimen sliding scale   SubCutaneous three times a day before meals  insulin lispro Injectable (HumaLOG) 4 Unit(s) SubCutaneous three times a day before meals  metoprolol succinate ER 50 milliGRAM(s) Oral daily  pantoprazole    Tablet 40 milliGRAM(s) Oral before breakfast  polyethylene glycol 3350 17 Gram(s) Oral two times a day  simvastatin 20 milliGRAM(s) Oral at bedtime        VITALS:  T(F): 99.2 (18 @ 12:58), Max: 99.2 (18 @ 12:58)  HR: 66 (18 @ 12:58)  BP: 165/73 (18 @ 12:58)  RR: 20 (18 @ 12:58)  SpO2: --  Wt(kg): --     @ 07:01  -   @ 07:00  --------------------------------------------------------  IN: 675 mL / OUT: 200 mL / NET: 475 mL          LABS:      133<L>  |  99  |  59<H>  ----------------------------<  156<H>  5.4<H>   |  17  |  2.6<H>    Ca    7.9<L>      2018 09:13                            8.6    6.80  )-----------( 118      ( 2018 09:13 )             27.0       Urine Studies:  Urinalysis Basic - ( 2018 18:00 )    Color: Yellow / Appearance: Turbid / S.015 / pH:   Gluc:  / Ketone: Negative  / Bili: Negative / Urobili: 0.2 mg/dL   Blood:  / Protein: >=300 mg/dL / Nitrite: Negative   Leuk Esterase: Trace / RBC:  / WBC 1-2 /HPF   Sq Epi:  / Non Sq Epi: Moderate /HPF / Bacteria: Many /HPF      Potassium, Random Urine: 33 mmol/L ( @ 02:53)  Sodium, Random Urine: 64.0 mmoL/L ( @ 02:53)  Chloride, Random Urine: 44 ( @ 02:53)  Protein/Creatinine Ratio Calculation: 2.8 Ratio ( @ 02:53)  Creatinine, Random Urine: 79 mg/dL ( @ 02:53)      RADIOLOGY & ADDITIONAL STUDIES:
PROGRESS NOTE  Chief Complaint:  Patient is a 73y old  Female who presents with a chief complaint of mechanical fall (09 Nov 2018 11:13)      HPI:  74 y/o morbidly obese female patient with PMHx of HTN, DM, DLD and CKD p/w mechanical fall PTP. history was obtained from the ED chart as the patient is confused and unreliable and the  is not answering. Patient fell and twisted her ankle while getting out of bed to use her walker to the bathroom days ago.  Then she slipped again while he was at work and was on the floor all day long until her  found her and called [facility] to help get her back in bed. pt is no unable to get out of bed and ambulate. patient walks with a walker at baseline.     ROS wasn't obtained as patient is confused and unreliable.    No ankle fracture per ED but admitted for PT and rehab (08 Nov 2018 01:59)      ALLERGIES:  No Known Allergies      HOSPITAL MEDICATIONS:  MEDICATIONS  (STANDING):  dextrose 5%. 1000 milliLiter(s) (50 mL/Hr) IV Continuous <Continuous>  dextrose 50% Injectable 12.5 Gram(s) IV Push once  dextrose 50% Injectable 25 Gram(s) IV Push once  dextrose 50% Injectable 25 Gram(s) IV Push once  folic acid 1 milliGRAM(s) Oral daily  heparin  Injectable 5000 Unit(s) SubCutaneous every 8 hours  metoprolol succinate ER 50 milliGRAM(s) Oral daily  pantoprazole    Tablet 40 milliGRAM(s) Oral before breakfast  simvastatin 20 milliGRAM(s) Oral at bedtime  sodium chloride 0.9%. 1000 milliLiter(s) (75 mL/Hr) IV Continuous <Continuous>    MEDICATIONS  (PRN):  dextrose 40% Gel 15 Gram(s) Oral once PRN Blood Glucose LESS THAN 70 milliGRAM(s)/deciliter  docusate sodium 100 milliGRAM(s) Oral daily PRN Constipation  glucagon  Injectable 1 milliGRAM(s) IntraMuscular once PRN Glucose LESS THAN 70 milligrams/deciliter  lactulose Syrup 10 Gram(s) Oral two times a day PRN costipation  senna 1 Tablet(s) Oral daily PRN Constipation  traMADol 50 milliGRAM(s) Oral every 4 hours PRN Moderate Pain (4 - 6)      PMHX/PSHX:  Hyperlipidemia  HTN (hypertension)  DM (diabetes mellitus)      FAMILY HISTORY:  No pertinent family history in first degree relatives            General:  No wt loss, fevers, chills, night sweats, fatigue,   Eyes:  Good vision, no reported pain  ENT:  No sore throat, pain, runny nose, dysphagia  CV:  No pain, palpitations, hypo/hypertension  Resp:  No dyspnea, cough, tachypnea, wheezing  GI:  No pain, No nausea, No vomiting, No diarrhea, No constipation, No weight loss, No fever, No pruritis, No rectal bleeding, No tarry stools, No dysphagia,  :  No pain, bleeding, incontinence, nocturia  Muscle:  No pain, weakness  Neuro:  No weakness, tingling, memory problems  Psych:  No fatigue, insomnia, mood problems, depression  Endocrine:  No polyuria, polydipsia, cold/heat intolerance  Heme:  No petechiae, ecchymosis, easy bruisability  Skin:  Skin decoloration at the left ankle area tattoos, scars, edema  Ext - Left ankle edema and tender, decreased range of motion.       PHYSICAL EXAM:   ICU Vital Signs Last 24 Hrs  T(C): 36.7 (11 Nov 2018 13:25), Max: 36.7 (11 Nov 2018 13:25)  T(F): 98.1 (11 Nov 2018 13:25), Max: 98.1 (11 Nov 2018 13:25)  HR: 66 (11 Nov 2018 13:25) (66 - 133)  BP: 141/81 (11 Nov 2018 13:25) (132/58 - 176/79)          GENERAL:  Appears stated age, well-groomed, well-nourished, no distress  HEENT:  NC/AT,  conjunctivae clear and pink, no thyromegaly, nodules, adenopathy, no JVD, sclera -anicteric  CHEST:  Full & symmetric excursion, no increased effort, breath sounds clear  HEART:  Regular rhythm, S1, S2, no murmur/rub/S3/S4, no abdominal bruit, no edema  ABDOMEN:  Soft, non-tender, non-distended, normoactive bowel sounds,  no masses ,no hepato-splenomegaly, no signs of chronic liver disease  EXTEREMITIES:  no cyanosis,clubbing or edema  SKIN:  No rash/erythema/ecchymoses/petechiae/wounds/abscess/warm/dry  NEURO:  Alert, oriented, no asterixis, no tremor, no encephalopathy    LABS:                        9.1    9.72  )-----------( 89       ( 09 Nov 2018 13:08 )             28.2     11-08    135  |  98  |  67<HH>  ----------------------------<  125<H>  6.0<HH>   |  19  |  3.2<H>    Ca    8.1<L>      08 Nov 2018 09:42  Phos  5.3     11-08  Mg     3.2     11-08    TPro  7.2  /  Alb  3.4<L>  /  TBili  0.3  /  DBili  x   /  AST  36  /  ALT  15  /  AlkPhos  68  11-07    LIVER FUNCTIONS - ( 07 Nov 2018 19:30 )  Alb: 3.4 g/dL / Pro: 7.2 g/dL / ALK PHOS: 68 U/L / ALT: 15 U/L / AST: 36 U/L / GGT: x           PT/INR - ( 07 Nov 2018 19:30 )   PT: 10.80 sec;   INR: 0.94 ratio
PROGRESS NOTE  Chief Complaint:  Patient is a 73y old  Female who presents with a chief complaint of mechanical fall (2018 11:13)      HPI:  74 y/o morbidly obese female patient with PMHx of HTN, DM, DLD and CKD p/w mechanical fall PTP. history was obtained from the ED chart as the patient is confused and unreliable and the  is not answering. Patient fell and twisted her ankle while getting out of bed to use her walker to the bathroom days ago.  Then she slipped again while he was at work and was on the floor all day long until her  found her and called [facility] to help get her back in bed. pt is no unable to get out of bed and ambulate. patient walks with a walker at baseline.   ROS wasn't obtained as patient is confused and unreliable.  in the ed Vital Signs Last 24 Hrs  T(C): 36.8 (2018 00:01), Max: 36.8 (2018 00:01)  T(F): 98.3 (2018 00:01), Max: 98.3 (2018 00:01)  HR: 87 (2018 00:01) (87 - 88)  BP: 137/64 (2018 00:01) (137/64 - 149/74)  BP(mean): --  RR: 18 (2018 00:01) (17 - 18)  SpO2: 96% (2018 00:01) (95% - 96%)    No ankle fracture per ED but admitted for PT and rehab (2018 01:59)      ALLERGIES:  No Known Allergies      HOSPITAL MEDICATIONS:  MEDICATIONS  (STANDING):  dextrose 5%. 1000 milliLiter(s) (50 mL/Hr) IV Continuous <Continuous>  dextrose 50% Injectable 12.5 Gram(s) IV Push once  dextrose 50% Injectable 25 Gram(s) IV Push once  dextrose 50% Injectable 25 Gram(s) IV Push once  folic acid 1 milliGRAM(s) Oral daily  heparin  Injectable 5000 Unit(s) SubCutaneous every 8 hours  metoprolol succinate ER 50 milliGRAM(s) Oral daily  pantoprazole    Tablet 40 milliGRAM(s) Oral before breakfast  simvastatin 20 milliGRAM(s) Oral at bedtime  sodium chloride 0.9%. 1000 milliLiter(s) (75 mL/Hr) IV Continuous <Continuous>    MEDICATIONS  (PRN):  dextrose 40% Gel 15 Gram(s) Oral once PRN Blood Glucose LESS THAN 70 milliGRAM(s)/deciliter  docusate sodium 100 milliGRAM(s) Oral daily PRN Constipation  glucagon  Injectable 1 milliGRAM(s) IntraMuscular once PRN Glucose LESS THAN 70 milligrams/deciliter  lactulose Syrup 10 Gram(s) Oral two times a day PRN costipation  senna 1 Tablet(s) Oral daily PRN Constipation  traMADol 50 milliGRAM(s) Oral every 4 hours PRN Moderate Pain (4 - 6)      PMHX/PSHX:  Hyperlipidemia  HTN (hypertension)  DM (diabetes mellitus)      FAMILY HISTORY:  No pertinent family history in first degree relatives      SOCIAL HISTORY:    REVIEW OF SYSTEMS:     General:  No wt loss, fevers, chills, night sweats, fatigue,   Eyes:  Good vision, no reported pain  ENT:  No sore throat, pain, runny nose, dysphagia  CV:  No pain, palpitations, hypo/hypertension  Resp:  No dyspnea, cough, tachypnea, wheezing  GI:  No pain, No nausea, No vomiting, No diarrhea, No constipation, No weight loss, No fever, No pruritis, No rectal bleeding, No tarry stools, No dysphagia,  :  No pain, bleeding, incontinence, nocturia  Muscle:  No pain, weakness  Neuro:  No weakness, tingling, memory problems  Psych:  No fatigue, insomnia, mood problems, depression  Endocrine:  No polyuria, polydipsia, cold/heat intolerance  Heme:  No petechiae, ecchymosis, easy bruisability  Skin:  Skin decoloration at the left ankle area tattoos, scars, edema  Ext - Left ankle edema and tender, decreased range of motion.       PHYSICAL EXAM:   Vital Signs:  Vital Signs Last 24 Hrs  T(C): 36.2 (2018 12:30), Max: 37.1 (2018 21:00)  T(F): 97.2 (2018 12:30), Max: 98.8 (2018 21:00)  HR: 72 (2018 12:30) (67 - 72)  BP: 166/73 (2018 12:30) (135/72 - 174/73)  RR: 18 (2018 12:30) (18 - 18)  SpO2: 97% (2018 19:35) (97% - 98%)  Daily Height in cm: 157.48 (2018 17:45)    Daily Weight in k.4 (2018 17:45)    GENERAL:  Appears stated age, well-groomed, well-nourished, no distress  HEENT:  NC/AT,  conjunctivae clear and pink, no thyromegaly, nodules, adenopathy, no JVD, sclera -anicteric  CHEST:  Full & symmetric excursion, no increased effort, breath sounds clear  HEART:  Regular rhythm, S1, S2, no murmur/rub/S3/S4, no abdominal bruit, no edema  ABDOMEN:  Soft, non-tender, non-distended, normoactive bowel sounds,  no masses ,no hepato-splenomegaly, no signs of chronic liver disease  EXTEREMITIES:  no cyanosis,clubbing or edema  SKIN:  No rash/erythema/ecchymoses/petechiae/wounds/abscess/warm/dry  NEURO:  Alert, oriented, no asterixis, no tremor, no encephalopathy    LABS:                        9.1    9.72  )-----------( 89       ( 2018 13:08 )             28.2     11-    135  |  98  |  67<HH>  ----------------------------<  125<H>  6.0<HH>   |  19  |  3.2<H>    Ca    8.1<L>      2018 09:42  Phos  5.3       Mg     3.2         TPro  7.2  /  Alb  3.4<L>  /  TBili  0.3  /  DBili  x   /  AST  36  /  ALT  15  /  AlkPhos  68  11-07    LIVER FUNCTIONS - ( 2018 19:30 )  Alb: 3.4 g/dL / Pro: 7.2 g/dL / ALK PHOS: 68 U/L / ALT: 15 U/L / AST: 36 U/L / GGT: x           PT/INR - ( 2018 19:30 )   PT: 10.80 sec;   INR: 0.94 ratio         PTT - ( 2018 19:30 )  PTT:21.4 sec
SUBJECTIVE:    Patient is a 73y old Female who presents with a chief complaint of mechanical fall (14 Nov 2018 15:05)    Currently admitted to medicine with the primary diagnosis of Fall, initial encounter     Today is hospital day 8d. This morning she is resting comfortably in bed and reports no new issues or overnight events.     PAST MEDICAL & SURGICAL HISTORY  Hyperlipidemia  HTN (hypertension)  DM (diabetes mellitus)    SOCIAL HISTORY:  Negative for smoking/alcohol/drug use.     ALLERGIES:  No Known Allergies    MEDICATIONS:  STANDING MEDICATIONS  chlorhexidine 4% Liquid 1 Application(s) Topical <User Schedule>  dextrose 5%. 1000 milliLiter(s) IV Continuous <Continuous>  dextrose 50% Injectable 12.5 Gram(s) IV Push once  dextrose 50% Injectable 25 Gram(s) IV Push once  dextrose 50% Injectable 25 Gram(s) IV Push once  diltiazem    Tablet 90 milliGRAM(s) Oral two times a day  folic acid 1 milliGRAM(s) Oral daily  heparin  Injectable 5000 Unit(s) SubCutaneous every 8 hours  insulin glargine Injectable (LANTUS) 12 Unit(s) SubCutaneous at bedtime  insulin lispro (HumaLOG) corrective regimen sliding scale   SubCutaneous three times a day before meals  insulin lispro Injectable (HumaLOG) 4 Unit(s) SubCutaneous three times a day before meals  metoprolol succinate ER 50 milliGRAM(s) Oral daily  pantoprazole    Tablet 40 milliGRAM(s) Oral before breakfast  polyethylene glycol 3350 17 Gram(s) Oral two times a day  simvastatin 20 milliGRAM(s) Oral at bedtime    PRN MEDICATIONS  dextrose 40% Gel 15 Gram(s) Oral once PRN  docusate sodium 100 milliGRAM(s) Oral daily PRN  glucagon  Injectable 1 milliGRAM(s) IntraMuscular once PRN  lactulose Syrup 10 Gram(s) Oral two times a day PRN  oxyCODONE    5 mG/acetaminophen 325 mG 1 Tablet(s) Oral once PRN  senna 1 Tablet(s) Oral daily PRN  traMADol 50 milliGRAM(s) Oral every 4 hours PRN    VITALS:   T(F): 98.4  HR: 68  BP: 145/67  RR: 18  SpO2: 96%    LABS:                        8.4    6.15  )-----------( 123      ( 14 Nov 2018 07:15 )             26.9     11-14    135  |  101  |  67<HH>  ----------------------------<  88  5.4<H>   |  16<L>  |  2.6<H>    Ca    8.3<L>      14 Nov 2018 07:15                    RADIOLOGY:    PHYSICAL EXAM:  GEN: No acute distress, lying comfortably in bed   LUNGS: Clear to auscultation bilaterally, no labored breathing   HEART: S1/S2 present. RRR.   ABD: Soft, non-tender, non-distended. Bowel sounds present.   EXT: Noncyanotic, nonedematous, 2+ peripheral pulses, skin intact   NEURO: AAOX3, CN II-XII grossly intact     Lines/Tubes </u  Perry cathter:
SUBJECTIVE:    Patient is a 73y old Female who presents with a chief complaint of mechanical fall (15 Nov 2018 18:05)    Currently admitted to medicine with the primary diagnosis of Fall, initial encounter     Today is hospital day 9d. This morning she is resting comfortably in bed and reports no new issues or overnight events.     PAST MEDICAL & SURGICAL HISTORY  Hyperlipidemia  HTN (hypertension)  DM (diabetes mellitus)    SOCIAL HISTORY:  Negative for smoking/alcohol/drug use.     ALLERGIES:  No Known Allergies    MEDICATIONS:  STANDING MEDICATIONS  ascorbic acid 500 milliGRAM(s) Oral daily  chlorhexidine 4% Liquid 1 Application(s) Topical <User Schedule>  dextrose 5%. 1000 milliLiter(s) IV Continuous <Continuous>  dextrose 50% Injectable 12.5 Gram(s) IV Push once  dextrose 50% Injectable 25 Gram(s) IV Push once  dextrose 50% Injectable 25 Gram(s) IV Push once  diltiazem    Tablet 90 milliGRAM(s) Oral two times a day  folic acid 1 milliGRAM(s) Oral daily  heparin  Injectable 5000 Unit(s) SubCutaneous every 8 hours  insulin glargine Injectable (LANTUS) 12 Unit(s) SubCutaneous at bedtime  insulin lispro (HumaLOG) corrective regimen sliding scale   SubCutaneous three times a day before meals  insulin lispro Injectable (HumaLOG) 4 Unit(s) SubCutaneous three times a day before meals  lidocaine   Patch 1 Patch Transdermal every 24 hours  metoprolol succinate ER 50 milliGRAM(s) Oral daily  pantoprazole    Tablet 40 milliGRAM(s) Oral before breakfast  polyethylene glycol 3350 17 Gram(s) Oral two times a day  simvastatin 20 milliGRAM(s) Oral at bedtime  sodium bicarbonate 650 milliGRAM(s) Oral three times a day  sodium chloride 0.45% 1000 milliLiter(s) IV Continuous <Continuous>    PRN MEDICATIONS  dextrose 40% Gel 15 Gram(s) Oral once PRN  docusate sodium 100 milliGRAM(s) Oral daily PRN  glucagon  Injectable 1 milliGRAM(s) IntraMuscular once PRN  lactulose Syrup 10 Gram(s) Oral two times a day PRN  oxyCODONE    5 mG/acetaminophen 325 mG 1 Tablet(s) Oral every 8 hours PRN  senna 1 Tablet(s) Oral daily PRN  traMADol 50 milliGRAM(s) Oral every 4 hours PRN    VITALS:   T(F): 97  HR: 63  BP: 170/74  RR: 18  SpO2: --    LABS:                        8.5    6.90  )-----------( 129      ( 15 Nov 2018 09:02 )             26.8     11-15    132<L>  |  99  |  70<HH>  ----------------------------<  142<H>  5.6<H>   |  16<L>  |  3.0<H>    Ca    8.6      15 Nov 2018 09:02                    RADIOLOGY:    PHYSICAL EXAM:  GEN: No acute distress, lying comfortably in bed, morbidly obese  LUNGS: Clear to auscultation bilaterally, no labored breathing   HEART: S1/S2 present. RRR.   ABD: Soft, non-tender, non-distended. Bowel sounds present.   EXT: Noncyanotic, nonedematous, 2+ peripheral pulses, skin intact   NEURO: AAOX3, CN II-XII grossly intact   MUSCULOSKELETAL: Right hip pain and left foot/ankle pain upon palpation    Lines/Tubes </u  Perry cathter:
SUBJECTIVE:    Patient is a 73y old Female who presents with a chief complaint of mechanical fall (2018 13:38)    Currently admitted to medicine with the primary diagnosis of Ambulatory dysfunction     Today is hospital day 4d. This morning she is resting comfortably in bed and reports no new issues or overnight events.     PAST MEDICAL & SURGICAL HISTORY  Hyperlipidemia  HTN (hypertension)  DM (diabetes mellitus)    SOCIAL HISTORY:  Negative for smoking/alcohol/drug use.     ALLERGIES:  No Known Allergies    MEDICATIONS:  STANDING MEDICATIONS  chlorhexidine 4% Liquid 1 Application(s) Topical <User Schedule>  dextrose 5%. 1000 milliLiter(s) IV Continuous <Continuous>  dextrose 50% Injectable 12.5 Gram(s) IV Push once  dextrose 50% Injectable 25 Gram(s) IV Push once  dextrose 50% Injectable 25 Gram(s) IV Push once  diltiazem    Tablet 90 milliGRAM(s) Oral two times a day  folic acid 1 milliGRAM(s) Oral daily  heparin  Injectable 5000 Unit(s) SubCutaneous every 8 hours  insulin glargine Injectable (LANTUS) 12 Unit(s) SubCutaneous at bedtime  insulin lispro (HumaLOG) corrective regimen sliding scale   SubCutaneous three times a day before meals  insulin lispro Injectable (HumaLOG) 4 Unit(s) SubCutaneous three times a day before meals  metoprolol succinate ER 50 milliGRAM(s) Oral daily  pantoprazole    Tablet 40 milliGRAM(s) Oral before breakfast  polyethylene glycol 3350 17 Gram(s) Oral two times a day  simvastatin 20 milliGRAM(s) Oral at bedtime  sodium chloride 0.9%. 1000 milliLiter(s) IV Continuous <Continuous>    PRN MEDICATIONS  dextrose 40% Gel 15 Gram(s) Oral once PRN  docusate sodium 100 milliGRAM(s) Oral daily PRN  glucagon  Injectable 1 milliGRAM(s) IntraMuscular once PRN  lactulose Syrup 10 Gram(s) Oral two times a day PRN  senna 1 Tablet(s) Oral daily PRN  traMADol 50 milliGRAM(s) Oral every 4 hours PRN    VITALS:   T(F): 98.1  HR: 66  BP: 141/81  RR: 18  SpO2: 97%    LABS:            Urinalysis Basic - ( 10 Nov 2018 00:45 )    Color: Yellow / Appearance: Turbid / S.015 / pH: x  Gluc: x / Ketone: Negative  / Bili: Negative / Urobili: 0.2 mg/dL   Blood: x / Protein: >=300 mg/dL / Nitrite: Negative   Leuk Esterase: Negative / RBC: x / WBC x   Sq Epi: x / Non Sq Epi: x / Bacteria: Many /HPF            Culture - Urine (collected 10 Nov 2018 00:45)  Source: .Urine Clean Catch (Midstream)  Preliminary Report (2018 11:37):    >100,000 CFU/ml Proteus mirabilis          RADIOLOGY:    PHYSICAL EXAM:  GEN: No acute distress, lying comfortably in bed   LUNGS: Clear to auscultation bilaterally, no labored breathing   HEART: S1/S2 present. RRR.   ABD: Soft, non-tender, non-distended  EXT: Noncyanotic, nonedematous, 2+ peripheral pulses, skin intact   NEURO: AAOX3, CN II-XII grossly intact     Lines/Tubes </u  Perry cathter:
SUBJECTIVE:    Patient is a 73y old Female who presents with a chief complaint of mechanical fall (2018 14:28)    Currently admitted to medicine with the primary diagnosis of Ambulatory dysfunction     Today is hospital day 6d. This morning she is resting comfortably in bed and reports no new issues or overnight events.     PAST MEDICAL & SURGICAL HISTORY  Hyperlipidemia  HTN (hypertension)  DM (diabetes mellitus)    SOCIAL HISTORY:  Negative for smoking/alcohol/drug use.     ALLERGIES:  No Known Allergies    MEDICATIONS:  STANDING MEDICATIONS  chlorhexidine 4% Liquid 1 Application(s) Topical <User Schedule>  dextrose 5%. 1000 milliLiter(s) IV Continuous <Continuous>  dextrose 50% Injectable 12.5 Gram(s) IV Push once  dextrose 50% Injectable 25 Gram(s) IV Push once  dextrose 50% Injectable 25 Gram(s) IV Push once  diltiazem    Tablet 90 milliGRAM(s) Oral two times a day  folic acid 1 milliGRAM(s) Oral daily  heparin  Injectable 5000 Unit(s) SubCutaneous every 8 hours  insulin glargine Injectable (LANTUS) 12 Unit(s) SubCutaneous at bedtime  insulin lispro (HumaLOG) corrective regimen sliding scale   SubCutaneous three times a day before meals  insulin lispro Injectable (HumaLOG) 4 Unit(s) SubCutaneous three times a day before meals  metoprolol succinate ER 50 milliGRAM(s) Oral daily  pantoprazole    Tablet 40 milliGRAM(s) Oral before breakfast  polyethylene glycol 3350 17 Gram(s) Oral two times a day  simvastatin 20 milliGRAM(s) Oral at bedtime    PRN MEDICATIONS  dextrose 40% Gel 15 Gram(s) Oral once PRN  docusate sodium 100 milliGRAM(s) Oral daily PRN  glucagon  Injectable 1 milliGRAM(s) IntraMuscular once PRN  lactulose Syrup 10 Gram(s) Oral two times a day PRN  senna 1 Tablet(s) Oral daily PRN  traMADol 50 milliGRAM(s) Oral every 4 hours PRN    VITALS:   T(F): 96.9  HR: 81  BP: 167/74  RR: 18  SpO2: 98%    LABS:                        8.6    6.80  )-----------( 118      ( 2018 09:13 )             27.0     11-12    133<L>  |  99  |  59<H>  ----------------------------<  156<H>  5.4<H>   |  17  |  2.6<H>    Ca    7.9<L>      2018 09:13        Urinalysis Basic - ( 2018 18:00 )    Color: Yellow / Appearance: Turbid / S.015 / pH: x  Gluc: x / Ketone: Negative  / Bili: Negative / Urobili: 0.2 mg/dL   Blood: x / Protein: >=300 mg/dL / Nitrite: Negative   Leuk Esterase: Trace / RBC: x / WBC 1-2 /HPF   Sq Epi: x / Non Sq Epi: Moderate /HPF / Bacteria: Many /HPF                RADIOLOGY:    PHYSICAL EXAM:  GEN: No acute distress, lying comfortably in bed, morbidly obese   LUNGS: Clear to auscultation bilaterally, no labored breathing   HEART: S1/S2 present. RRR.   ABD: Soft, non-tender, non-distended. Bowel sounds present.   EXT: Noncyanotic, nonedematous, 2+ peripheral pulses, skin intact   NEURO: AAOX3, CN II-XII grossly intact   Musculoskeletal: Pain upon palpation of left ankle on dorsiflexion plantarflexion     Lines/Tubes </u  Perry cathter:
SUBJECTIVE:    Patient is a 73y old Female who presents with a chief complaint of mechanical fall (2018 16:17)    Currently admitted to medicine with the primary diagnosis of Ambulatory dysfunction     Today is hospital day 5d. This morning she is resting comfortably in bed and reports no new issues or overnight events.     PAST MEDICAL & SURGICAL HISTORY  Hyperlipidemia  HTN (hypertension)  DM (diabetes mellitus)    SOCIAL HISTORY:  Negative for smoking/alcohol/drug use.     ALLERGIES:  No Known Allergies    MEDICATIONS:  STANDING MEDICATIONS  chlorhexidine 4% Liquid 1 Application(s) Topical <User Schedule>  dextrose 5%. 1000 milliLiter(s) IV Continuous <Continuous>  dextrose 50% Injectable 12.5 Gram(s) IV Push once  dextrose 50% Injectable 25 Gram(s) IV Push once  dextrose 50% Injectable 25 Gram(s) IV Push once  diltiazem    Tablet 90 milliGRAM(s) Oral two times a day  folic acid 1 milliGRAM(s) Oral daily  heparin  Injectable 5000 Unit(s) SubCutaneous every 8 hours  insulin glargine Injectable (LANTUS) 12 Unit(s) SubCutaneous at bedtime  insulin lispro (HumaLOG) corrective regimen sliding scale   SubCutaneous three times a day before meals  insulin lispro Injectable (HumaLOG) 4 Unit(s) SubCutaneous three times a day before meals  metoprolol succinate ER 50 milliGRAM(s) Oral daily  pantoprazole    Tablet 40 milliGRAM(s) Oral before breakfast  polyethylene glycol 3350 17 Gram(s) Oral two times a day  simvastatin 20 milliGRAM(s) Oral at bedtime  sodium polystyrene sulfonate Suspension 30 Gram(s) Oral once    PRN MEDICATIONS  dextrose 40% Gel 15 Gram(s) Oral once PRN  docusate sodium 100 milliGRAM(s) Oral daily PRN  glucagon  Injectable 1 milliGRAM(s) IntraMuscular once PRN  lactulose Syrup 10 Gram(s) Oral two times a day PRN  senna 1 Tablet(s) Oral daily PRN  traMADol 50 milliGRAM(s) Oral every 4 hours PRN    VITALS:   T(F): 99.2  HR: 66  BP: 165/73  RR: 20  SpO2: --    LABS:                        8.6    6.80  )-----------( 118      ( 2018 09:13 )             27.0     11-12    133<L>  |  99  |  59<H>  ----------------------------<  156<H>  5.4<H>   |  17  |  2.6<H>    Ca    7.9<L>      2018 09:13        Urinalysis Basic - ( 2018 18:00 )    Color: Yellow / Appearance: Turbid / S.015 / pH: x  Gluc: x / Ketone: Negative  / Bili: Negative / Urobili: 0.2 mg/dL   Blood: x / Protein: >=300 mg/dL / Nitrite: Negative   Leuk Esterase: Trace / RBC: x / WBC 1-2 /HPF   Sq Epi: x / Non Sq Epi: Moderate /HPF / Bacteria: Many /HPF            Culture - Urine (collected 10 Nov 2018 00:45)  Source: .Urine Clean Catch (Midstream)  Final Report (2018 08:15):    >100,000 CFU/ml Proteus mirabilis  Organism: Proteus mirabilis (2018 08:15)  Organism: Proteus mirabilis (2018 08:15)          RADIOLOGY:    PHYSICAL EXAM:  GEN: No acute distress, lying comfortably in bed, morbidly obese   LUNGS: Clear to auscultation bilaterally, no labored breathing   HEART: S1/S2 present. RRR.   ABD: Soft, non-tender, non-distended. Bowel sounds present.   EXT: Noncyanotic, nonedematous, 2+ peripheral pulses, skin intact   NEURO: AAOX3, CN II-XII grossly intact   Musculoskeletal: Pain upon palpation of left ankle dorsiflexion plantarflexion     Lines/Tubes </u  Perry cathter:
SUBJECTIVE:    Patient is a 73y old Female who presents with a chief complaint of mechanical fall and foot pain (09 Nov 2018 14:22)    Currently admitted to medicine with the primary diagnosis of Ambulatory dysfunction     Today is hospital day 2d. This morning she is resting comfortably in bed and reports no new issues or overnight events.     PAST MEDICAL & SURGICAL HISTORY  Hyperlipidemia  HTN (hypertension)  DM (diabetes mellitus)    SOCIAL HISTORY:  Negative for smoking/alcohol/drug use.     ALLERGIES:  No Known Allergies    MEDICATIONS:  STANDING MEDICATIONS  dextrose 5%. 1000 milliLiter(s) IV Continuous <Continuous>  dextrose 50% Injectable 12.5 Gram(s) IV Push once  dextrose 50% Injectable 25 Gram(s) IV Push once  dextrose 50% Injectable 25 Gram(s) IV Push once  folic acid 1 milliGRAM(s) Oral daily  heparin  Injectable 5000 Unit(s) SubCutaneous every 8 hours  metoprolol succinate ER 50 milliGRAM(s) Oral daily  pantoprazole    Tablet 40 milliGRAM(s) Oral before breakfast  polyethylene glycol 3350 17 Gram(s) Oral two times a day  simvastatin 20 milliGRAM(s) Oral at bedtime  sodium chloride 0.9%. 1000 milliLiter(s) IV Continuous <Continuous>    PRN MEDICATIONS  dextrose 40% Gel 15 Gram(s) Oral once PRN  docusate sodium 100 milliGRAM(s) Oral daily PRN  glucagon  Injectable 1 milliGRAM(s) IntraMuscular once PRN  lactulose Syrup 10 Gram(s) Oral two times a day PRN  senna 1 Tablet(s) Oral daily PRN  traMADol 50 milliGRAM(s) Oral every 4 hours PRN    VITALS:   T(F): 97.2  HR: 72  BP: 166/73  RR: 18  SpO2: 97%    LABS:                        9.2    8.57  )-----------( 90       ( 09 Nov 2018 15:40 )             28.5     11-09    132<L>  |  98  |  64<HH>  ----------------------------<  169<H>  4.9   |  18  |  2.8<H>    Ca    7.9<L>      09 Nov 2018 13:08  Phos  5.3     11-08  Mg     3.2     11-08    TPro  7.2  /  Alb  3.4<L>  /  TBili  0.3  /  DBili  x   /  AST  36  /  ALT  15  /  AlkPhos  68  11-07    PT/INR - ( 07 Nov 2018 19:30 )   PT: 10.80 sec;   INR: 0.94 ratio         PTT - ( 07 Nov 2018 19:30 )  PTT:21.4 sec          CARDIAC MARKERS ( 07 Nov 2018 19:30 )  x     / x     / 467 U/L / x     / x          RADIOLOGY:    PHYSICAL EXAM:  Physical Exam: T(C): 36.8 (11-08-18 @ 00:01), Max: 36.8 (11-08-18 @ 00:01)  HR: 87 (11-08-18 @ 00:01) (87 - 88)  BP: 137/64 (11-08-18 @ 00:01) (137/64 - 149/74)  RR: 18 (11-08-18 @ 00:01) (17 - 18)  SpO2: 96% (11-08-18 @ 00:01) (95% - 96%)   PHYSICAL EXAM:  GENERAL: morbid obesity  HEAD:  Atraumatic, Normocephalic  EYES: EOMI, PERRLA, conjunctiva and sclera clear  ENT:No nasal obstruction or discharge. No tonsillar exudate, swelling or erythema.  NECK: Supple, No JVD  CHEST/LUNG: Clear to auscultation bilaterally; No wheeze  HEART: Regular rate and rhythm; No murmurs, rubs, or gallops  ABDOMEN: Soft, Nontender, Nondistended; Bowel sounds present  EXTREMITIES:  2+ Peripheral Pulses, No clubbing, cyanosis, or edema  PSYCH: AAOx2-3 and confused. doesn't appropriate answer questions NEUROLOGY: non-focal	      Lines/Tubes </u  Perry cathter:
SUBJECTIVE:    Patient is a 73y old Female who presents with a chief complaint of mechanical fall (13 Nov 2018 16:23)    Currently admitted to medicine with the primary diagnosis of Fall, initial encounter     Today is hospital day 7d. This morning she is resting comfortably in bed and reports no new issues or overnight events.     PAST MEDICAL & SURGICAL HISTORY  Hyperlipidemia  HTN (hypertension)  DM (diabetes mellitus)    SOCIAL HISTORY:  Negative for smoking/alcohol/drug use.     ALLERGIES:  No Known Allergies    MEDICATIONS:  STANDING MEDICATIONS  chlorhexidine 4% Liquid 1 Application(s) Topical <User Schedule>  dextrose 5%. 1000 milliLiter(s) IV Continuous <Continuous>  dextrose 50% Injectable 12.5 Gram(s) IV Push once  dextrose 50% Injectable 25 Gram(s) IV Push once  dextrose 50% Injectable 25 Gram(s) IV Push once  diltiazem    Tablet 90 milliGRAM(s) Oral two times a day  folic acid 1 milliGRAM(s) Oral daily  heparin  Injectable 5000 Unit(s) SubCutaneous every 8 hours  insulin glargine Injectable (LANTUS) 12 Unit(s) SubCutaneous at bedtime  insulin lispro (HumaLOG) corrective regimen sliding scale   SubCutaneous three times a day before meals  insulin lispro Injectable (HumaLOG) 4 Unit(s) SubCutaneous three times a day before meals  metoprolol succinate ER 50 milliGRAM(s) Oral daily  pantoprazole    Tablet 40 milliGRAM(s) Oral before breakfast  polyethylene glycol 3350 17 Gram(s) Oral two times a day  simvastatin 20 milliGRAM(s) Oral at bedtime    PRN MEDICATIONS  dextrose 40% Gel 15 Gram(s) Oral once PRN  docusate sodium 100 milliGRAM(s) Oral daily PRN  glucagon  Injectable 1 milliGRAM(s) IntraMuscular once PRN  lactulose Syrup 10 Gram(s) Oral two times a day PRN  senna 1 Tablet(s) Oral daily PRN  traMADol 50 milliGRAM(s) Oral every 4 hours PRN    VITALS:   T(F): 98.8  HR: 73  BP: 136/74  RR: 18  SpO2: 95%    LABS:                        8.4    6.15  )-----------( 123      ( 14 Nov 2018 07:15 )             26.9     11-14    135  |  101  |  67<HH>  ----------------------------<  88  5.4<H>   |  16<L>  |  2.6<H>    Ca    8.3<L>      14 Nov 2018 07:15                Culture - Urine (collected 11 Nov 2018 18:00)  Source: .Urine Clean Catch (Midstream)  Final Report (13 Nov 2018 09:55):    Culture grew 3 or more types of organisms which indicate    collection contamination; consider recollection only if clinically    indicated.          RADIOLOGY:    PHYSICAL EXAM:  GEN: No acute distress, lying comfortably in bed   LUNGS: Clear to auscultation bilaterally, no labored breathing   HEART: S1/S2 present. RRR.   ABD: Soft, non-tender, non-distended. Bowel sounds present.   EXT: Noncyanotic, nonedematous, 2+ peripheral pulses, skin intact   NEURO: AAOX3, CN II-XII grossly intact     Lines/Tubes </u  Perry cathter:

## 2018-11-16 NOTE — PROGRESS NOTE ADULT - NSHPATTENDINGPLANDISCUSS_GEN_ALL_CORE
House staff
patient, medical resident, covering nurse, 
patient, medical resident, covering nurse, 
patient, patient's , medical resident, covering nurse

## 2018-11-16 NOTE — PROGRESS NOTE ADULT - ATTENDING COMMENTS
Nephrology evaluation for LALO and Potassium and PT evaluation.   Left ankle is tender and swollen. On NSAIDS   Patient is morbidly obese and need full assistance for ADL.   Highly suspected vitamin D deficiency - Supplementing now    Renal evaluation. Appreciated
Nephrology evaluation for LALO and Potassium and PT evaluation.   Left ankle is tender and swollen. On NSAIDS   Patient is morbidly obese and need full assistance for ADL.   Highly suspected vitamin D deficiency - Supplementing now    Renal evaluation. Appreciated  C/W IVF for now and D/C in the morning. Monitoring daily renal function  pain more or less same at the ankle. Likely sprain
Nephrology evaluation, Potassium and PT evaluation.   Left ankle is tender and swollen  Patient is morbidly obese and need full assistance for ADL.   Highly suspected vitamin D deficiency - Supplementing now    Need dialysis ? Renal evaluation
Patient seen and examined independently. I personally had a face-to-face encounter with the patient, examined the patient myself and reviewed the plan of care with the housestaff. Agree with Resident's note but my note supersedes the resident's note in matters hereby listed.     S : No CP/SOB  Other ROS neg.    Corroborate with above exam.  Left ankle swollen and tender. ROM restricted d/t pain.  Labs/Rad : Reviewed.     74 y/o morbidly obese female patient with PMHx of HTN, DM, DLD and CKD p/w mechanical fall.      #Mechanical fall in the setting of morbid obesity and inability to ambulate   - 11-12-18: Pt continues to c/o pain to left foot/ankle, however no evidence of fracture per ED, Ankle xray was negative. C/w pain cotrol with tramadol. Physiatry recommends STR in SNF, will follow up with . PT service was called, they will be seeing pt either today or early tomorrow morning. Ortho consult pending.    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 D/c IVFs   Per nephro recommendations, Will continue to hold losartan, UA was negative yet urine culture was positive for proteus, however, likely contaminated due to female condom catheter. Will f/u with repeat urine culture. If bp elevation worsen - can add nifedipine xl 30mg po qd.     #SVT  -11-10-18: Pt had one episode of SVT two nights ago --> medical records were faxed and pt was found to have a history of Afib on caridemz 90 q12 --> this was started yesterday, however pt did not receive her AM does and pt had SVT episode last night. Pt has been getting her usual dose today and pt has not had an episode since.     #Hyponatremia   Was 2/2 dehydration. Better now.    #DM  insulin protocol if FS more than 180  monitor FS    #HTN and DLD  c/w losartan and simvstatin    #Others  Diet, Consistent carb diet  DVT and GI PPX  from home  Full code
Patient was evaluated and examined by bedside, c/o persistent left ankle pain, left 5th toe pain, tolerating diet well.    All labs, radiology studies, VS was reviewed  I agree with medical plan outlined by Medical resident as stated above.  -s/p fall with left ankle sprain - persistent pain, pain management, PT/OT tx. daily as tolerated, ortho f/up, if pain persists-consider open MRI of left ankle/foot  -topical lidocaine patch tx., PO percocet for severe pain    - CKD stage 4- with metabolic acidosis - added po sodium bicarbonate tx. 650mg po tid  -daily BMP monitoring    Hyperkalemia- no ace inhibitors  -f/up BMP today    - Severe morbid obesity  -outpatient weight loss tx.  -bianca work up    -DM 2- on insulin tx.    -DVT proph daily .    d/c plan: patient is awaiting placement to STR
Patient seen and examined independently. I personally had a face-to-face encounter with the patient, examined the patient myself and reviewed the plan of care with the housestaff. Agree with Resident's note but my note supersedes the resident's note in matters hereby listed.     S : No CP/SOB  Other ROS neg.    Corroborate with above exam.  Left ankle swollen and tender. ROM restricted d/t pain.  Labs/Rad : Reviewed.     74 y/o morbidly obese female patient with PMHx of HTN, DM, DLD and CKD p/w mechanical fall.      #Mechanical fall in the setting of morbid obesity and inability to ambulate    Pt continues to c/o pain to left foot/ankle, however no evidence of fracture per ED, Ankle xray was negative. C/w pain cotrol with tramadol. Physiatry recommends STR in SNF, will follow up with . PT service was called, they will be seeing pt either today or early tomorrow morning. Ortho wants repeat Ankle Xrays. After that patient will be just pending STR placement.    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 Off IVFs   Per nephro recommendations, Will continue to hold losartan, UA was negative yet urine culture was positive for proteus, however, likely contaminated due to female condom catheter. Will f/u with repeat urine culture. If bp elevation worsen - can add nifedipine xl 30mg po qd.     #SVT  -11-10-18: Pt had one episode of SVT two nights ago --> medical records were faxed and pt was found to have a history of Afib on caridemz 90 q12 --> this was started yesterday, however pt did not receive her AM does and pt had SVT episode last night. Pt has been getting her usual dose today and pt has not had an episode since.     #Hyponatremia   Was 2/2 dehydration. Better now.    #Hyperkalemia : monitor    #DM  insulin protocol if FS more than 180  monitor FS    #HTN and DLD  c/w losartan and simvstatin    #Others  Diet, Consistent carb diet  DVT and GI PPX  from home  Full code .   Pending STR placement (medically ready if repeat Ankle Xrays negative)
Patient was evaluated and examined by bedside, c/o left foot pain, tolerating diet well.    All labs, radiology studies, VS was reviewed  I agree with medical plan outlined by Medical resident as stated above.  - daily PT/OT as tolerated  -Pain control  -left foot ankle sprain- might consider outpatient open MRI, ortho f/up  -for chronic medical conditions- resumed on home regimen tx.  -severe obesity- outpatient weight loss tx.  -patient anticipated for d/c to STR once bed available
Patient was evaluated and examined by bedside, c/o persistent left ankle pain, left 5th toe pain, tolerating diet well.    All labs, radiology studies, VS was reviewed  I agree with medical plan outlined by Medical resident as stated above.  -s/p fall with left ankle sprain - persistent pain, pain management, PT/OT tx. daily as tolerated, ortho f/up, if pain persists-consider open MRI of left ankle/foot  -topical lidocaine patch tx., PO percocet for severe pain    - CKD stage 4- with metabolic acidosis - add po sodium bicarbonate tx. 650mg po tid  -daily BMP monitoring    Hyperkalemia- no ace inhibitors  -tx. with one dose of kayexalate today    - Severe morbid obesity  -outpatient weight loss tx.  -bianca work up    -DM 2- on insulin tx.    -DVT proph daily

## 2018-11-16 NOTE — PROGRESS NOTE ADULT - ASSESSMENT
LALO  CKD stage 3-4  - baseline Cr - 2's  near nephrotic range proteinuria  proteus bacteriuria   hyperkalemia  hyponatremia   metabolic acidosis  morbid obesity  fall / gait disturbance  HTN  anemia    plan:    cont oral sodium bicarbonate  dc ivf  low K+ diet  lantus / humalog  cardizem / lopressor  pt / rehab  stable for dc to snf from renal standpoint  bmp check and renal f/u in 1-2 weeks

## 2018-11-16 NOTE — PROGRESS NOTE ADULT - REASON FOR ADMISSION
mechanical fall
mechanical fall and foot pain

## 2018-11-17 ENCOUNTER — OUTPATIENT (OUTPATIENT)
Dept: OUTPATIENT SERVICES | Facility: HOSPITAL | Age: 73
LOS: 1 days | Discharge: HOME | End: 2018-11-17

## 2018-11-17 DIAGNOSIS — I10 ESSENTIAL (PRIMARY) HYPERTENSION: ICD-10-CM

## 2018-11-17 PROBLEM — E11.9 TYPE 2 DIABETES MELLITUS WITHOUT COMPLICATIONS: Chronic | Status: ACTIVE | Noted: 2018-11-07

## 2018-11-17 PROBLEM — E78.5 HYPERLIPIDEMIA, UNSPECIFIED: Chronic | Status: ACTIVE | Noted: 2018-11-07

## 2018-11-21 DIAGNOSIS — E55.9 VITAMIN D DEFICIENCY, UNSPECIFIED: ICD-10-CM

## 2018-11-21 DIAGNOSIS — E86.0 DEHYDRATION: ICD-10-CM

## 2018-11-21 DIAGNOSIS — E87.2 ACIDOSIS: ICD-10-CM

## 2018-11-21 DIAGNOSIS — E66.01 MORBID (SEVERE) OBESITY DUE TO EXCESS CALORIES: ICD-10-CM

## 2018-11-21 DIAGNOSIS — M25.572 PAIN IN LEFT ANKLE AND JOINTS OF LEFT FOOT: ICD-10-CM

## 2018-11-21 DIAGNOSIS — S96.912A STRAIN OF UNSPECIFIED MUSCLE AND TENDON AT ANKLE AND FOOT LEVEL, LEFT FOOT, INITIAL ENCOUNTER: ICD-10-CM

## 2018-11-21 DIAGNOSIS — E87.5 HYPERKALEMIA: ICD-10-CM

## 2018-11-21 DIAGNOSIS — E78.5 HYPERLIPIDEMIA, UNSPECIFIED: ICD-10-CM

## 2018-11-21 DIAGNOSIS — E11.22 TYPE 2 DIABETES MELLITUS WITH DIABETIC CHRONIC KIDNEY DISEASE: ICD-10-CM

## 2018-11-21 DIAGNOSIS — N18.4 CHRONIC KIDNEY DISEASE, STAGE 4 (SEVERE): ICD-10-CM

## 2018-11-21 DIAGNOSIS — E87.1 HYPO-OSMOLALITY AND HYPONATREMIA: ICD-10-CM

## 2018-11-21 DIAGNOSIS — Z79.84 LONG TERM (CURRENT) USE OF ORAL HYPOGLYCEMIC DRUGS: ICD-10-CM

## 2018-11-21 DIAGNOSIS — I12.9 HYPERTENSIVE CHRONIC KIDNEY DISEASE WITH STAGE 1 THROUGH STAGE 4 CHRONIC KIDNEY DISEASE, OR UNSPECIFIED CHRONIC KIDNEY DISEASE: ICD-10-CM

## 2018-11-21 DIAGNOSIS — I48.91 UNSPECIFIED ATRIAL FIBRILLATION: ICD-10-CM

## 2018-11-21 DIAGNOSIS — N17.9 ACUTE KIDNEY FAILURE, UNSPECIFIED: ICD-10-CM
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
## 2018-11-26 ENCOUNTER — OUTPATIENT (OUTPATIENT)
Dept: OUTPATIENT SERVICES | Facility: HOSPITAL | Age: 73
LOS: 1 days | Discharge: HOME | End: 2018-11-26

## 2018-11-26 DIAGNOSIS — I10 ESSENTIAL (PRIMARY) HYPERTENSION: ICD-10-CM

## 2018-11-26 DIAGNOSIS — K59.00 CONSTIPATION, UNSPECIFIED: ICD-10-CM

## 2018-12-03 ENCOUNTER — OUTPATIENT (OUTPATIENT)
Dept: OUTPATIENT SERVICES | Facility: HOSPITAL | Age: 73
LOS: 1 days | Discharge: HOME | End: 2018-12-03

## 2018-12-03 DIAGNOSIS — K59.00 CONSTIPATION, UNSPECIFIED: ICD-10-CM

## 2018-12-03 DIAGNOSIS — I10 ESSENTIAL (PRIMARY) HYPERTENSION: ICD-10-CM

## 2018-12-04 ENCOUNTER — OUTPATIENT (OUTPATIENT)
Dept: OUTPATIENT SERVICES | Facility: HOSPITAL | Age: 73
LOS: 1 days | Discharge: HOME | End: 2018-12-04

## 2018-12-04 DIAGNOSIS — E87.5 HYPERKALEMIA: ICD-10-CM

## 2018-12-10 ENCOUNTER — OUTPATIENT (OUTPATIENT)
Dept: OUTPATIENT SERVICES | Facility: HOSPITAL | Age: 73
LOS: 1 days | Discharge: HOME | End: 2018-12-10

## 2018-12-10 DIAGNOSIS — I10 ESSENTIAL (PRIMARY) HYPERTENSION: ICD-10-CM

## 2018-12-10 DIAGNOSIS — K59.00 CONSTIPATION, UNSPECIFIED: ICD-10-CM

## 2018-12-11 ENCOUNTER — EMERGENCY (EMERGENCY)
Facility: HOSPITAL | Age: 73
LOS: 1 days | Discharge: HOME | End: 2018-12-11
Attending: EMERGENCY MEDICINE | Admitting: EMERGENCY MEDICINE

## 2018-12-11 VITALS
RESPIRATION RATE: 18 BRPM | DIASTOLIC BLOOD PRESSURE: 67 MMHG | SYSTOLIC BLOOD PRESSURE: 161 MMHG | TEMPERATURE: 97 F | HEART RATE: 72 BPM | OXYGEN SATURATION: 98 %

## 2018-12-11 VITALS
RESPIRATION RATE: 18 BRPM | HEART RATE: 78 BPM | OXYGEN SATURATION: 98 % | DIASTOLIC BLOOD PRESSURE: 76 MMHG | TEMPERATURE: 98 F | SYSTOLIC BLOOD PRESSURE: 143 MMHG

## 2018-12-11 DIAGNOSIS — Z79.84 LONG TERM (CURRENT) USE OF ORAL HYPOGLYCEMIC DRUGS: ICD-10-CM

## 2018-12-11 DIAGNOSIS — R79.9 ABNORMAL FINDING OF BLOOD CHEMISTRY, UNSPECIFIED: ICD-10-CM

## 2018-12-11 DIAGNOSIS — E11.9 TYPE 2 DIABETES MELLITUS WITHOUT COMPLICATIONS: ICD-10-CM

## 2018-12-11 DIAGNOSIS — Z79.899 OTHER LONG TERM (CURRENT) DRUG THERAPY: ICD-10-CM

## 2018-12-11 DIAGNOSIS — I12.9 HYPERTENSIVE CHRONIC KIDNEY DISEASE WITH STAGE 1 THROUGH STAGE 4 CHRONIC KIDNEY DISEASE, OR UNSPECIFIED CHRONIC KIDNEY DISEASE: ICD-10-CM

## 2018-12-11 DIAGNOSIS — Z79.891 LONG TERM (CURRENT) USE OF OPIATE ANALGESIC: ICD-10-CM

## 2018-12-11 DIAGNOSIS — N18.9 CHRONIC KIDNEY DISEASE, UNSPECIFIED: ICD-10-CM

## 2018-12-11 DIAGNOSIS — E78.00 PURE HYPERCHOLESTEROLEMIA, UNSPECIFIED: ICD-10-CM

## 2018-12-11 DIAGNOSIS — D69.6 THROMBOCYTOPENIA, UNSPECIFIED: ICD-10-CM

## 2018-12-11 LAB
ANION GAP SERPL CALC-SCNC: 12 MMOL/L — SIGNIFICANT CHANGE UP (ref 7–14)
BASOPHILS # BLD AUTO: 0.02 K/UL — SIGNIFICANT CHANGE UP (ref 0–0.2)
BASOPHILS NFR BLD AUTO: 0.3 % — SIGNIFICANT CHANGE UP (ref 0–1)
BLD GP AB SCN SERPL QL: SIGNIFICANT CHANGE UP
BUN SERPL-MCNC: 61 MG/DL — CRITICAL HIGH (ref 10–20)
CALCIUM SERPL-MCNC: 8.9 MG/DL — SIGNIFICANT CHANGE UP (ref 8.5–10.1)
CHLORIDE SERPL-SCNC: 96 MMOL/L — LOW (ref 98–110)
CO2 SERPL-SCNC: 29 MMOL/L — SIGNIFICANT CHANGE UP (ref 17–32)
CREAT SERPL-MCNC: 2.4 MG/DL — HIGH (ref 0.7–1.5)
EOSINOPHIL # BLD AUTO: 0.28 K/UL — SIGNIFICANT CHANGE UP (ref 0–0.7)
EOSINOPHIL NFR BLD AUTO: 3.9 % — SIGNIFICANT CHANGE UP (ref 0–8)
GLUCOSE SERPL-MCNC: 68 MG/DL — LOW (ref 70–99)
HCT VFR BLD CALC: 25.9 % — LOW (ref 37–47)
HGB BLD-MCNC: 8.4 G/DL — LOW (ref 12–16)
IMM GRANULOCYTES NFR BLD AUTO: 0.6 % — HIGH (ref 0.1–0.3)
LYMPHOCYTES # BLD AUTO: 0.92 K/UL — LOW (ref 1.2–3.4)
LYMPHOCYTES # BLD AUTO: 12.8 % — LOW (ref 20.5–51.1)
MCHC RBC-ENTMCNC: 31 PG — SIGNIFICANT CHANGE UP (ref 27–31)
MCHC RBC-ENTMCNC: 32.4 G/DL — SIGNIFICANT CHANGE UP (ref 32–37)
MCV RBC AUTO: 95.6 FL — SIGNIFICANT CHANGE UP (ref 81–99)
MONOCYTES # BLD AUTO: 0.56 K/UL — SIGNIFICANT CHANGE UP (ref 0.1–0.6)
MONOCYTES NFR BLD AUTO: 7.8 % — SIGNIFICANT CHANGE UP (ref 1.7–9.3)
NEUTROPHILS # BLD AUTO: 5.35 K/UL — SIGNIFICANT CHANGE UP (ref 1.4–6.5)
NEUTROPHILS NFR BLD AUTO: 74.6 % — SIGNIFICANT CHANGE UP (ref 42.2–75.2)
NRBC # BLD: 0 /100 WBCS — SIGNIFICANT CHANGE UP (ref 0–0)
PLATELET # BLD AUTO: 55 K/UL — LOW (ref 130–400)
POTASSIUM SERPL-MCNC: 5.3 MMOL/L — HIGH (ref 3.5–5)
POTASSIUM SERPL-SCNC: 5.3 MMOL/L — HIGH (ref 3.5–5)
RBC # BLD: 2.71 M/UL — LOW (ref 4.2–5.4)
RBC # FLD: 13.8 % — SIGNIFICANT CHANGE UP (ref 11.5–14.5)
SODIUM SERPL-SCNC: 137 MMOL/L — SIGNIFICANT CHANGE UP (ref 135–146)
TYPE + AB SCN PNL BLD: SIGNIFICANT CHANGE UP
WBC # BLD: 7.17 K/UL — SIGNIFICANT CHANGE UP (ref 4.8–10.8)
WBC # FLD AUTO: 7.17 K/UL — SIGNIFICANT CHANGE UP (ref 4.8–10.8)

## 2018-12-11 NOTE — ED PROVIDER NOTE - CARE PROVIDER_API CALL
Lefty Miramontes), Hematology; Medical Oncology  [id number]  [place] [address]  Phone: [phone]  Fax: [phone]

## 2018-12-11 NOTE — ED PROVIDER NOTE - CARE PLAN
Principal Discharge DX:	Thrombocytopenia, unspecified  Secondary Diagnosis:	CKD (chronic kidney disease)

## 2018-12-11 NOTE — ED ADULT NURSE NOTE - NS ED NRS NURSING HOMES
[facility], [place], operated by [facility], [facility]

## 2018-12-11 NOTE — ED PROVIDER NOTE - NSFOLLOWUPINSTRUCTIONS_ED_ALL_ED_FT
Medical Clearance    A medical screening exam has been done. This exam is meant to determine whether you need emergency treatment. Your exam has not demonstrated the need for emergency treatment at this point. It is safe for you to go to your caregiver's office or clinic for further evaluation and/or treatment. You should make an appointment to see your caregiver as soon as he or she is available.

## 2018-12-11 NOTE — ED ADULT NURSE NOTE - NSIMPLEMENTINTERV_GEN_ALL_ED
Implemented All Fall with Harm Risk Interventions:  [place] to call system. Call bell, personal items and telephone within reach. Instruct patient to call for assistance. Room bathroom lighting operational. Non-slip footwear when patient is off stretcher. Physically safe environment: no spills, clutter or unnecessary equipment. Stretcher in lowest position, wheels locked, appropriate side rails in place. Provide visual cue, wrist band, yellow gown, etc. Monitor gait and stability. Monitor for mental status changes and reorient to person, place, and time. Review medications for side effects contributing to fall risk. Reinforce activity limits and safety measures with patient and family. Provide visual clues: red socks.

## 2018-12-11 NOTE — ED PROVIDER NOTE - OBJECTIVE STATEMENT
Pt is a 72yo female with hx of CKD and low platelets who comes in for platelets checked two days ago of 53.  Pt feels well and in usual state of health.  No signs of bleeding, no CP/SOB/syncope.

## 2018-12-11 NOTE — ED ADULT NURSE NOTE - OBJECTIVE STATEMENT
Pt came to ED from [facility] for platelet count of 53 two days ago. Pt denies any complaints at this time.

## 2018-12-11 NOTE — ED PROVIDER NOTE - PHYSICAL EXAMINATION
Vital Signs: I have reviewed the initial vital signs.  Constitutional: well-nourished, appears stated age, no acute distress  ENT: pink conjunctivae  Cardiovascular: regular rate, regular rhythm, well-perfused extremities  Respiratory: unlabored respiratory effort, clear to auscultation bilaterally  Gastrointestinal: soft, non-tender obese abdomen  Integumentary: warm, dry, no rash  Neurologic: awake, alert, cranial nerves II-XII grossly intact, extremities’ motor and sensory functions grossly intact  Psychiatric: appropriate mood, appropriate affect

## 2018-12-13 ENCOUNTER — OUTPATIENT (OUTPATIENT)
Dept: OUTPATIENT SERVICES | Facility: HOSPITAL | Age: 73
LOS: 1 days | Discharge: HOME | End: 2018-12-13

## 2018-12-13 DIAGNOSIS — D69.6 THROMBOCYTOPENIA, UNSPECIFIED: ICD-10-CM

## 2018-12-18 ENCOUNTER — OUTPATIENT (OUTPATIENT)
Dept: OUTPATIENT SERVICES | Facility: HOSPITAL | Age: 73
LOS: 1 days | Discharge: HOME | End: 2018-12-18

## 2018-12-18 DIAGNOSIS — D69.6 THROMBOCYTOPENIA, UNSPECIFIED: ICD-10-CM

## 2018-12-18 DIAGNOSIS — K59.00 CONSTIPATION, UNSPECIFIED: ICD-10-CM

## 2018-12-20 ENCOUNTER — OUTPATIENT (OUTPATIENT)
Dept: OUTPATIENT SERVICES | Facility: HOSPITAL | Age: 73
LOS: 1 days | Discharge: HOME | End: 2018-12-20

## 2018-12-20 DIAGNOSIS — K59.00 CONSTIPATION, UNSPECIFIED: ICD-10-CM

## 2018-12-24 ENCOUNTER — OUTPATIENT (OUTPATIENT)
Dept: OUTPATIENT SERVICES | Facility: HOSPITAL | Age: 73
LOS: 1 days | Discharge: HOME | End: 2018-12-24

## 2018-12-24 DIAGNOSIS — K59.00 CONSTIPATION, UNSPECIFIED: ICD-10-CM

## 2018-12-24 DIAGNOSIS — D69.6 THROMBOCYTOPENIA, UNSPECIFIED: ICD-10-CM

## 2018-12-28 ENCOUNTER — OUTPATIENT (OUTPATIENT)
Dept: OUTPATIENT SERVICES | Facility: HOSPITAL | Age: 73
LOS: 1 days | Discharge: HOME | End: 2018-12-28

## 2018-12-28 DIAGNOSIS — I10 ESSENTIAL (PRIMARY) HYPERTENSION: ICD-10-CM

## 2019-07-17 NOTE — PATIENT PROFILE ADULT - DOES PATIENT HAVE ADVANCE DIRECTIVE
Port needle left accessed for treatment. Tolerated port access and draw without complaint. Port site scrubbed with Chloraprep for 30 seconds. Accessed using sterile technique. [place] drawn-Red/Green/Purple tubes. Double signed by patient and RN. See documentation flowsheet. Lea Castro, RN, BSN, OCN      
no

## 2021-02-05 NOTE — PATIENT PROFILE ADULT - NSPROMEDSHERBAL_GEN_A_NUR

## 2021-09-08 NOTE — ED ADULT NURSE NOTE - NSFALLRSKHMRSKTYOTFT_ED_ALL_ED
Sent a message with provider recommendations via [address] Patient appears to be active on MyChart. Closing this encounter as no further follow up needed at this time.
generalized weakness